# Patient Record
Sex: FEMALE | Race: BLACK OR AFRICAN AMERICAN | NOT HISPANIC OR LATINO | Employment: FULL TIME | URBAN - METROPOLITAN AREA
[De-identification: names, ages, dates, MRNs, and addresses within clinical notes are randomized per-mention and may not be internally consistent; named-entity substitution may affect disease eponyms.]

---

## 2017-08-24 ENCOUNTER — HOSPITAL ENCOUNTER (OUTPATIENT)
Dept: GENERAL RADIOLOGY | Facility: HOSPITAL | Age: 57
Discharge: HOME | End: 2017-08-24
Admitting: UROLOGY

## 2017-08-24 ENCOUNTER — APPOINTMENT (OUTPATIENT)
Dept: PREADMISSION TESTING | Facility: HOSPITAL | Age: 57
End: 2017-08-24

## 2017-08-24 VITALS
WEIGHT: 158.4 LBS | HEIGHT: 60 IN | OXYGEN SATURATION: 99 % | SYSTOLIC BLOOD PRESSURE: 114 MMHG | TEMPERATURE: 98 F | RESPIRATION RATE: 16 BRPM | HEART RATE: 77 BPM | DIASTOLIC BLOOD PRESSURE: 69 MMHG | BODY MASS INDEX: 31.1 KG/M2

## 2017-08-24 LAB
ABO GROUP BLD: NORMAL
ANION GAP SERPL CALCULATED.3IONS-SCNC: 14.7 MMOL/L
BLD GP AB SCN SERPL QL: NEGATIVE
BUN BLD-MCNC: 15 MG/DL (ref 6–20)
BUN/CREAT SERPL: 19.5 (ref 7–25)
CALCIUM SPEC-SCNC: 9.7 MG/DL (ref 8.6–10.5)
CHLORIDE SERPL-SCNC: 104 MMOL/L (ref 98–107)
CO2 SERPL-SCNC: 24.3 MMOL/L (ref 22–29)
CREAT BLD-MCNC: 0.77 MG/DL (ref 0.57–1)
DEPRECATED RDW RBC AUTO: 48.3 FL (ref 37–54)
ERYTHROCYTE [DISTWIDTH] IN BLOOD BY AUTOMATED COUNT: 19.7 % (ref 11.7–13)
GFR SERPL CREATININE-BSD FRML MDRD: 94 ML/MIN/1.73
GLUCOSE BLD-MCNC: 84 MG/DL (ref 65–99)
HCT VFR BLD AUTO: 32.2 % (ref 35.6–45.5)
HGB BLD-MCNC: 11.1 G/DL (ref 11.9–15.5)
MCH RBC QN AUTO: 23.2 PG (ref 26.9–32)
MCHC RBC AUTO-ENTMCNC: 34.5 G/DL (ref 32.4–36.3)
MCV RBC AUTO: 67.4 FL (ref 80.5–98.2)
PLATELET # BLD AUTO: 250 10*3/MM3 (ref 140–500)
PMV BLD AUTO: ABNORMAL FL (ref 6–12)
POTASSIUM BLD-SCNC: 4.2 MMOL/L (ref 3.5–5.2)
RBC # BLD AUTO: 4.78 10*6/MM3 (ref 3.9–5.2)
RH BLD: POSITIVE
SODIUM BLD-SCNC: 143 MMOL/L (ref 136–145)
WBC NRBC COR # BLD: 5.77 10*3/MM3 (ref 4.5–10.7)

## 2017-08-24 PROCEDURE — 86900 BLOOD TYPING SEROLOGIC ABO: CPT | Performed by: UROLOGY

## 2017-08-24 PROCEDURE — 86850 RBC ANTIBODY SCREEN: CPT | Performed by: UROLOGY

## 2017-08-24 PROCEDURE — 93005 ELECTROCARDIOGRAM TRACING: CPT

## 2017-08-24 PROCEDURE — 71020 HC CHEST PA AND LATERAL: CPT

## 2017-08-24 PROCEDURE — 86901 BLOOD TYPING SEROLOGIC RH(D): CPT | Performed by: UROLOGY

## 2017-08-24 PROCEDURE — 36415 COLL VENOUS BLD VENIPUNCTURE: CPT

## 2017-08-24 PROCEDURE — 80048 BASIC METABOLIC PNL TOTAL CA: CPT | Performed by: UROLOGY

## 2017-08-24 PROCEDURE — 85027 COMPLETE CBC AUTOMATED: CPT | Performed by: UROLOGY

## 2017-08-24 PROCEDURE — 93010 ELECTROCARDIOGRAM REPORT: CPT | Performed by: INTERNAL MEDICINE

## 2017-08-24 NOTE — DISCHARGE INSTRUCTIONS
Take the following medications the morning of surgery with a small sip of water:  NONE    ARRIVE AT 1:30PM        General Instructions:  • Do not eat solid food after midnight the night before surgery.  • You may drink clear liquids day of surgery but must stop at least one hour before your hospital arrival time.  • It is beneficial for you to have a clear drink that contains carbohydrates the day of surgery.  We suggest a 20 ounce bottle of Gatorade or Powerade for non-diabetic patients or a 20 ounce bottle of G2 or Powerade Zero for diabetic patients. (Pediatric patients, are not advised to drink a 20 ounce carbohydrate drink)    Clear liquids are liquids you can see through.  Nothing red in color.     Plain water                               Sports drinks  Sodas                                   Gelatin (Jell-O)  Fruit juices without pulp such as white grape juice and apple juice  Popsicles that contain no fruit or yogurt  Tea or coffee (no cream or milk added)  Gatorade / Powerade  G2 / Powerade Zero    • Infants may have breast milk up to four hours before surgery.  • Infants drinking formula may drink formula up to six hours before surgery.   • Patients who avoid smoking, chewing tobacco and alcohol for 4 weeks prior to surgery have a reduced risk of post-operative complications.  Quit smoking as many days before surgery as you can.  • Do not smoke, use chewing tobacco or drink alcohol the day of surgery.   • If applicable bring your C-PAP/ BI-PAP machine.  • Bring any papers given to you in the doctor’s office.  • Wear clean comfortable clothes and socks.  • Do not wear contact lenses or make-up.  Bring a case for your glasses.   • Bring crutches or walker if applicable.  • Leave all other valuables and jewelry at home.  • The Pre-Admission Testing nurse will instruct you to bring medications if unable to obtain an accurate list in Pre-Admission Testing.        If you were given a blood bank ID arm band  remember to bring it with you the day of surgery.    Preventing a Surgical Site Infection:  • For 2 to 3 days before surgery, avoid shaving with a razor because the razor can irritate skin and make it easier to develop an infection.  • The night prior to surgery sleep in a clean bed with clean clothing.  Do not allow pets to sleep with you.  • Shower on the morning of surgery using a fresh bar of anti-bacterial soap (such as Dial) and clean washcloth.  Dry with a clean towel and dress in clean clothing.  • Ask your surgeon if you will be receiving antibiotics prior to surgery.  • Make sure you, your family, and all healthcare providers clean their hands with soap and water or an alcohol based hand  before caring for you or your wound.    Day of surgery:  Upon arrival, a Pre-op nurse and Anesthesiologist will review your health history, obtain vital signs, and answer questions you may have.  The only belongings needed at this time will be your home medications and if applicable your C-PAP/BI-PAP machine.  If you are staying overnight your family can leave the rest of your belongings in the car and bring them to your room later.  A Pre-op nurse will start an IV and you may receive medication in preparation for surgery, including something to help you relax.  Your family will be able to see you in the Pre-op area.  While you are in surgery your family should notify the waiting room  if they leave the waiting room area and provide a contact phone number.    Please be aware that surgery does come with discomfort.  We want to make every effort to control your discomfort so please discuss any uncontrolled symptoms with your nurse.   Your doctor will most likely have prescribed pain medications.      If you are going home after surgery you will receive individualized written care instructions before being discharged.  A responsible adult must drive you to and from the hospital on the day of your surgery  and stay with you for 24 hours.    If you are staying overnight following surgery, you will be transported to your hospital room following the recovery period.  [facility] has all private rooms.    If you have any questions please call Pre-Admission Testing at [phone].  Deductibles and co-payments are collected on the day of service. Please be prepared to pay the required co-pay, deductible or deposit on the day of service as defined by your plan.

## 2017-08-28 ENCOUNTER — ANESTHESIA (OUTPATIENT)
Dept: PERIOP | Facility: HOSPITAL | Age: 57
End: 2017-08-28

## 2017-08-28 ENCOUNTER — HOSPITAL ENCOUNTER (INPATIENT)
Facility: HOSPITAL | Age: 57
LOS: 3 days | Discharge: HOME | End: 2017-08-31
Attending: UROLOGY | Admitting: UROLOGY

## 2017-08-28 ENCOUNTER — ANESTHESIA EVENT (OUTPATIENT)
Dept: PERIOP | Facility: HOSPITAL | Age: 57
End: 2017-08-28

## 2017-08-28 DIAGNOSIS — N28.89 LEFT RENAL MASS: ICD-10-CM

## 2017-08-28 LAB
ANION GAP SERPL CALCULATED.3IONS-SCNC: 11.6 MMOL/L
BUN BLD-MCNC: 14 MG/DL (ref 6–20)
BUN/CREAT SERPL: 16.3 (ref 7–25)
CALCIUM SPEC-SCNC: 9 MG/DL (ref 8.6–10.5)
CHLORIDE SERPL-SCNC: 102 MMOL/L (ref 98–107)
CO2 SERPL-SCNC: 25.4 MMOL/L (ref 22–29)
CREAT BLD-MCNC: 0.86 MG/DL (ref 0.57–1)
DEPRECATED RDW RBC AUTO: 46.7 FL (ref 37–54)
ERYTHROCYTE [DISTWIDTH] IN BLOOD BY AUTOMATED COUNT: 19.5 % (ref 11.7–13)
GFR SERPL CREATININE-BSD FRML MDRD: 83 ML/MIN/1.73
GLUCOSE BLD-MCNC: 136 MG/DL (ref 65–99)
HCT VFR BLD AUTO: 33.1 % (ref 35.6–45.5)
HGB BLD-MCNC: 11.6 G/DL (ref 11.9–15.5)
MCH RBC QN AUTO: 23.2 PG (ref 26.9–32)
MCHC RBC AUTO-ENTMCNC: 35 G/DL (ref 32.4–36.3)
MCV RBC AUTO: 66.1 FL (ref 80.5–98.2)
PLATELET # BLD AUTO: 249 10*3/MM3 (ref 140–500)
POTASSIUM BLD-SCNC: 4.3 MMOL/L (ref 3.5–5.2)
RBC # BLD AUTO: 5.01 10*6/MM3 (ref 3.9–5.2)
SODIUM BLD-SCNC: 139 MMOL/L (ref 136–145)
WBC NRBC COR # BLD: 12.3 10*3/MM3 (ref 4.5–10.7)

## 2017-08-28 PROCEDURE — 88307 TISSUE EXAM BY PATHOLOGIST: CPT | Performed by: UROLOGY

## 2017-08-28 PROCEDURE — 94799 UNLISTED PULMONARY SVC/PX: CPT

## 2017-08-28 PROCEDURE — 25010000002 HYDROMORPHONE PER 4 MG: Performed by: NURSE ANESTHETIST, CERTIFIED REGISTERED

## 2017-08-28 PROCEDURE — 25010000002 FENTANYL CITRATE (PF) 100 MCG/2ML SOLUTION: Performed by: NURSE ANESTHETIST, CERTIFIED REGISTERED

## 2017-08-28 PROCEDURE — 88341 IMHCHEM/IMCYTCHM EA ADD ANTB: CPT | Performed by: UROLOGY

## 2017-08-28 PROCEDURE — 88344 IMHCHEM/IMCYTCHM EA MLT ANTB: CPT | Performed by: UROLOGY

## 2017-08-28 PROCEDURE — 88342 IMHCHEM/IMCYTCHM 1ST ANTB: CPT | Performed by: UROLOGY

## 2017-08-28 PROCEDURE — 25010000002 ONDANSETRON PER 1 MG: Performed by: UROLOGY

## 2017-08-28 PROCEDURE — 85027 COMPLETE CBC AUTOMATED: CPT | Performed by: UROLOGY

## 2017-08-28 PROCEDURE — 80048 BASIC METABOLIC PNL TOTAL CA: CPT | Performed by: UROLOGY

## 2017-08-28 PROCEDURE — 25010000002 ONDANSETRON PER 1 MG: Performed by: NURSE ANESTHETIST, CERTIFIED REGISTERED

## 2017-08-28 PROCEDURE — 25010000002 MIDAZOLAM PER 1 MG: Performed by: ANESTHESIOLOGY

## 2017-08-28 PROCEDURE — 25010000003 CEFAZOLIN IN DEXTROSE 2-4 GM/100ML-% SOLUTION: Performed by: UROLOGY

## 2017-08-28 PROCEDURE — 25010000002 PROPOFOL 10 MG/ML EMULSION: Performed by: NURSE ANESTHETIST, CERTIFIED REGISTERED

## 2017-08-28 PROCEDURE — 88331 PATH CONSLTJ SURG 1 BLK 1SPC: CPT | Performed by: UROLOGY

## 2017-08-28 PROCEDURE — 25010000002 NEOSTIGMINE PER 0.5 MG: Performed by: NURSE ANESTHETIST, CERTIFIED REGISTERED

## 2017-08-28 PROCEDURE — 0TT14ZZ RESECTION OF LEFT KIDNEY, PERCUTANEOUS ENDOSCOPIC APPROACH: ICD-10-PCS | Performed by: UROLOGY

## 2017-08-28 PROCEDURE — 25010000002 HYDROMORPHONE PER 4 MG: Performed by: UROLOGY

## 2017-08-28 RX ORDER — NALOXONE HCL 0.4 MG/ML
0.1 VIAL (ML) INJECTION
Status: DISCONTINUED | OUTPATIENT
Start: 2017-08-28 | End: 2017-08-31 | Stop reason: HOSPADM

## 2017-08-28 RX ORDER — HYDROCODONE BITARTRATE AND ACETAMINOPHEN 7.5; 325 MG/1; MG/1
1 TABLET ORAL EVERY 4 HOURS PRN
Status: DISCONTINUED | OUTPATIENT
Start: 2017-08-28 | End: 2017-08-31 | Stop reason: HOSPADM

## 2017-08-28 RX ORDER — ONDANSETRON 4 MG/1
4 TABLET, ORALLY DISINTEGRATING ORAL EVERY 6 HOURS PRN
Status: DISCONTINUED | OUTPATIENT
Start: 2017-08-28 | End: 2017-08-31 | Stop reason: HOSPADM

## 2017-08-28 RX ORDER — HYDRALAZINE HYDROCHLORIDE 20 MG/ML
5 INJECTION INTRAMUSCULAR; INTRAVENOUS
Status: DISCONTINUED | OUTPATIENT
Start: 2017-08-28 | End: 2017-08-28 | Stop reason: HOSPADM

## 2017-08-28 RX ORDER — ONDANSETRON 2 MG/ML
4 INJECTION INTRAMUSCULAR; INTRAVENOUS EVERY 6 HOURS PRN
Status: DISCONTINUED | OUTPATIENT
Start: 2017-08-28 | End: 2017-08-31 | Stop reason: HOSPADM

## 2017-08-28 RX ORDER — PROMETHAZINE HYDROCHLORIDE 25 MG/1
25 SUPPOSITORY RECTAL ONCE AS NEEDED
Status: DISCONTINUED | OUTPATIENT
Start: 2017-08-28 | End: 2017-08-28 | Stop reason: HOSPADM

## 2017-08-28 RX ORDER — PROMETHAZINE HYDROCHLORIDE 25 MG/ML
12.5 INJECTION, SOLUTION INTRAMUSCULAR; INTRAVENOUS ONCE AS NEEDED
Status: DISCONTINUED | OUTPATIENT
Start: 2017-08-28 | End: 2017-08-28 | Stop reason: HOSPADM

## 2017-08-28 RX ORDER — BISACODYL 10 MG
10 SUPPOSITORY, RECTAL RECTAL DAILY
Status: DISCONTINUED | OUTPATIENT
Start: 2017-08-29 | End: 2017-08-31

## 2017-08-28 RX ORDER — ONDANSETRON 2 MG/ML
INJECTION INTRAMUSCULAR; INTRAVENOUS AS NEEDED
Status: DISCONTINUED | OUTPATIENT
Start: 2017-08-28 | End: 2017-08-28 | Stop reason: SURG

## 2017-08-28 RX ORDER — FENTANYL CITRATE 50 UG/ML
50 INJECTION, SOLUTION INTRAMUSCULAR; INTRAVENOUS
Status: DISCONTINUED | OUTPATIENT
Start: 2017-08-28 | End: 2017-08-28 | Stop reason: HOSPADM

## 2017-08-28 RX ORDER — LABETALOL HYDROCHLORIDE 5 MG/ML
5 INJECTION, SOLUTION INTRAVENOUS
Status: DISCONTINUED | OUTPATIENT
Start: 2017-08-28 | End: 2017-08-28 | Stop reason: HOSPADM

## 2017-08-28 RX ORDER — HYDROMORPHONE HYDROCHLORIDE 1 MG/ML
0.5 INJECTION, SOLUTION INTRAMUSCULAR; INTRAVENOUS; SUBCUTANEOUS
Status: DISCONTINUED | OUTPATIENT
Start: 2017-08-28 | End: 2017-08-28 | Stop reason: HOSPADM

## 2017-08-28 RX ORDER — FAMOTIDINE 10 MG/ML
20 INJECTION, SOLUTION INTRAVENOUS ONCE
Status: COMPLETED | OUTPATIENT
Start: 2017-08-28 | End: 2017-08-28

## 2017-08-28 RX ORDER — MIDAZOLAM HYDROCHLORIDE 1 MG/ML
1 INJECTION INTRAMUSCULAR; INTRAVENOUS
Status: DISCONTINUED | OUTPATIENT
Start: 2017-08-28 | End: 2017-08-28 | Stop reason: HOSPADM

## 2017-08-28 RX ORDER — SENNA AND DOCUSATE SODIUM 50; 8.6 MG/1; MG/1
2 TABLET, FILM COATED ORAL 2 TIMES DAILY
Status: DISCONTINUED | OUTPATIENT
Start: 2017-08-28 | End: 2017-08-31 | Stop reason: HOSPADM

## 2017-08-28 RX ORDER — HYDROMORPHONE HYDROCHLORIDE 1 MG/ML
0.5 INJECTION, SOLUTION INTRAMUSCULAR; INTRAVENOUS; SUBCUTANEOUS
Status: DISCONTINUED | OUTPATIENT
Start: 2017-08-28 | End: 2017-08-31 | Stop reason: HOSPADM

## 2017-08-28 RX ORDER — GLYCOPYRROLATE 0.2 MG/ML
INJECTION INTRAMUSCULAR; INTRAVENOUS AS NEEDED
Status: DISCONTINUED | OUTPATIENT
Start: 2017-08-28 | End: 2017-08-28 | Stop reason: SURG

## 2017-08-28 RX ORDER — SODIUM CHLORIDE 9 MG/ML
50 INJECTION, SOLUTION INTRAVENOUS CONTINUOUS
Status: DISCONTINUED | OUTPATIENT
Start: 2017-08-28 | End: 2017-08-31 | Stop reason: HOSPADM

## 2017-08-28 RX ORDER — PROPOFOL 10 MG/ML
VIAL (ML) INTRAVENOUS AS NEEDED
Status: DISCONTINUED | OUTPATIENT
Start: 2017-08-28 | End: 2017-08-28 | Stop reason: SURG

## 2017-08-28 RX ORDER — MIDAZOLAM HYDROCHLORIDE 1 MG/ML
2 INJECTION INTRAMUSCULAR; INTRAVENOUS
Status: DISCONTINUED | OUTPATIENT
Start: 2017-08-28 | End: 2017-08-28 | Stop reason: HOSPADM

## 2017-08-28 RX ORDER — EPHEDRINE SULFATE 50 MG/ML
5 INJECTION, SOLUTION INTRAVENOUS ONCE AS NEEDED
Status: DISCONTINUED | OUTPATIENT
Start: 2017-08-28 | End: 2017-08-28 | Stop reason: HOSPADM

## 2017-08-28 RX ORDER — CEFAZOLIN SODIUM 2 G/100ML
2 INJECTION, SOLUTION INTRAVENOUS EVERY 8 HOURS
Status: DISCONTINUED | OUTPATIENT
Start: 2017-08-28 | End: 2017-08-28 | Stop reason: HOSPADM

## 2017-08-28 RX ORDER — NALOXONE HCL 0.4 MG/ML
0.2 VIAL (ML) INJECTION AS NEEDED
Status: DISCONTINUED | OUTPATIENT
Start: 2017-08-28 | End: 2017-08-28 | Stop reason: HOSPADM

## 2017-08-28 RX ORDER — CEFAZOLIN SODIUM 2 G/100ML
INJECTION, SOLUTION INTRAVENOUS
Status: DISPENSED
Start: 2017-08-28 | End: 2017-08-29

## 2017-08-28 RX ORDER — FLUMAZENIL 0.1 MG/ML
0.2 INJECTION INTRAVENOUS AS NEEDED
Status: DISCONTINUED | OUTPATIENT
Start: 2017-08-28 | End: 2017-08-28 | Stop reason: HOSPADM

## 2017-08-28 RX ORDER — DIPHENHYDRAMINE HYDROCHLORIDE 50 MG/ML
12.5 INJECTION INTRAMUSCULAR; INTRAVENOUS
Status: DISCONTINUED | OUTPATIENT
Start: 2017-08-28 | End: 2017-08-28 | Stop reason: HOSPADM

## 2017-08-28 RX ORDER — ROCURONIUM BROMIDE 10 MG/ML
INJECTION, SOLUTION INTRAVENOUS AS NEEDED
Status: DISCONTINUED | OUTPATIENT
Start: 2017-08-28 | End: 2017-08-28 | Stop reason: SURG

## 2017-08-28 RX ORDER — ONDANSETRON 4 MG/1
4 TABLET, FILM COATED ORAL EVERY 6 HOURS PRN
Status: DISCONTINUED | OUTPATIENT
Start: 2017-08-28 | End: 2017-08-31 | Stop reason: HOSPADM

## 2017-08-28 RX ORDER — FENTANYL CITRATE 50 UG/ML
INJECTION, SOLUTION INTRAMUSCULAR; INTRAVENOUS AS NEEDED
Status: DISCONTINUED | OUTPATIENT
Start: 2017-08-28 | End: 2017-08-28 | Stop reason: SURG

## 2017-08-28 RX ORDER — LIDOCAINE HYDROCHLORIDE 20 MG/ML
INJECTION, SOLUTION INFILTRATION; PERINEURAL AS NEEDED
Status: DISCONTINUED | OUTPATIENT
Start: 2017-08-28 | End: 2017-08-28 | Stop reason: SURG

## 2017-08-28 RX ORDER — SODIUM CHLORIDE 0.9 % (FLUSH) 0.9 %
1-10 SYRINGE (ML) INJECTION AS NEEDED
Status: DISCONTINUED | OUTPATIENT
Start: 2017-08-28 | End: 2017-08-28 | Stop reason: HOSPADM

## 2017-08-28 RX ORDER — SODIUM CHLORIDE, SODIUM LACTATE, POTASSIUM CHLORIDE, CALCIUM CHLORIDE 600; 310; 30; 20 MG/100ML; MG/100ML; MG/100ML; MG/100ML
100 INJECTION, SOLUTION INTRAVENOUS CONTINUOUS
Status: DISCONTINUED | OUTPATIENT
Start: 2017-08-28 | End: 2017-08-29

## 2017-08-28 RX ORDER — ALBUTEROL SULFATE 2.5 MG/3ML
2.5 SOLUTION RESPIRATORY (INHALATION) ONCE AS NEEDED
Status: DISCONTINUED | OUTPATIENT
Start: 2017-08-28 | End: 2017-08-28 | Stop reason: HOSPADM

## 2017-08-28 RX ORDER — PROMETHAZINE HYDROCHLORIDE 25 MG/1
12.5 TABLET ORAL ONCE AS NEEDED
Status: DISCONTINUED | OUTPATIENT
Start: 2017-08-28 | End: 2017-08-28 | Stop reason: HOSPADM

## 2017-08-28 RX ORDER — LIDOCAINE HYDROCHLORIDE 10 MG/ML
INJECTION, SOLUTION EPIDURAL; INFILTRATION; INTRACAUDAL; PERINEURAL AS NEEDED
Status: DISCONTINUED | OUTPATIENT
Start: 2017-08-28 | End: 2017-08-28 | Stop reason: HOSPADM

## 2017-08-28 RX ORDER — HYDROMORPHONE HCL 110MG/55ML
PATIENT CONTROLLED ANALGESIA SYRINGE INTRAVENOUS AS NEEDED
Status: DISCONTINUED | OUTPATIENT
Start: 2017-08-28 | End: 2017-08-28 | Stop reason: SURG

## 2017-08-28 RX ORDER — SODIUM CHLORIDE 9 MG/ML
INJECTION, SOLUTION INTRAVENOUS AS NEEDED
Status: DISCONTINUED | OUTPATIENT
Start: 2017-08-28 | End: 2017-08-28 | Stop reason: HOSPADM

## 2017-08-28 RX ORDER — ONDANSETRON 2 MG/ML
4 INJECTION INTRAMUSCULAR; INTRAVENOUS ONCE AS NEEDED
Status: DISCONTINUED | OUTPATIENT
Start: 2017-08-28 | End: 2017-08-28 | Stop reason: HOSPADM

## 2017-08-28 RX ORDER — SODIUM CHLORIDE, SODIUM LACTATE, POTASSIUM CHLORIDE, CALCIUM CHLORIDE 600; 310; 30; 20 MG/100ML; MG/100ML; MG/100ML; MG/100ML
9 INJECTION, SOLUTION INTRAVENOUS CONTINUOUS
Status: DISCONTINUED | OUTPATIENT
Start: 2017-08-28 | End: 2017-08-29 | Stop reason: HOSPADM

## 2017-08-28 RX ORDER — PROMETHAZINE HYDROCHLORIDE 25 MG/1
25 TABLET ORAL ONCE AS NEEDED
Status: DISCONTINUED | OUTPATIENT
Start: 2017-08-28 | End: 2017-08-28 | Stop reason: HOSPADM

## 2017-08-28 RX ADMIN — LIDOCAINE HYDROCHLORIDE 40 MG: 20 INJECTION, SOLUTION INFILTRATION; PERINEURAL at 16:58

## 2017-08-28 RX ADMIN — SODIUM CHLORIDE, POTASSIUM CHLORIDE, SODIUM LACTATE AND CALCIUM CHLORIDE 9 ML/HR: 600; 310; 30; 20 INJECTION, SOLUTION INTRAVENOUS at 14:20

## 2017-08-28 RX ADMIN — HYDROMORPHONE HYDROCHLORIDE 0.5 MG: 1 INJECTION, SOLUTION INTRAMUSCULAR; INTRAVENOUS; SUBCUTANEOUS at 19:34

## 2017-08-28 RX ADMIN — HYDROMORPHONE HYDROCHLORIDE 0.5 MG: 1 INJECTION, SOLUTION INTRAMUSCULAR; INTRAVENOUS; SUBCUTANEOUS at 21:52

## 2017-08-28 RX ADMIN — CEFAZOLIN SODIUM 2 G: 2 INJECTION, SOLUTION INTRAVENOUS at 16:52

## 2017-08-28 RX ADMIN — SODIUM CHLORIDE, POTASSIUM CHLORIDE, SODIUM LACTATE AND CALCIUM CHLORIDE: 600; 310; 30; 20 INJECTION, SOLUTION INTRAVENOUS at 17:54

## 2017-08-28 RX ADMIN — FENTANYL CITRATE 50 MCG: 50 INJECTION INTRAMUSCULAR; INTRAVENOUS at 17:25

## 2017-08-28 RX ADMIN — GLYCOPYRROLATE 0.4 MG: 0.2 INJECTION INTRAMUSCULAR; INTRAVENOUS at 18:48

## 2017-08-28 RX ADMIN — FENTANYL CITRATE 50 MCG: 50 INJECTION INTRAMUSCULAR; INTRAVENOUS at 20:12

## 2017-08-28 RX ADMIN — NEOSTIGMINE METHYLSULFATE 3 MG: 1 INJECTION INTRAMUSCULAR; INTRAVENOUS; SUBCUTANEOUS at 18:48

## 2017-08-28 RX ADMIN — SODIUM CHLORIDE 100 ML/HR: 9 INJECTION, SOLUTION INTRAVENOUS at 21:52

## 2017-08-28 RX ADMIN — ONDANSETRON 4 MG: 2 INJECTION INTRAMUSCULAR; INTRAVENOUS at 18:49

## 2017-08-28 RX ADMIN — HYDROCODONE BITARTRATE AND ACETAMINOPHEN 1 TABLET: 7.5; 325 TABLET ORAL at 23:27

## 2017-08-28 RX ADMIN — FENTANYL CITRATE 50 MCG: 50 INJECTION INTRAMUSCULAR; INTRAVENOUS at 18:09

## 2017-08-28 RX ADMIN — FENTANYL CITRATE 50 MCG: 50 INJECTION INTRAMUSCULAR; INTRAVENOUS at 19:32

## 2017-08-28 RX ADMIN — FENTANYL CITRATE 100 MCG: 50 INJECTION INTRAMUSCULAR; INTRAVENOUS at 16:58

## 2017-08-28 RX ADMIN — HYDROMORPHONE HYDROCHLORIDE 0.5 MG: 2 INJECTION, SOLUTION INTRAMUSCULAR; INTRAVENOUS; SUBCUTANEOUS at 18:20

## 2017-08-28 RX ADMIN — FAMOTIDINE 20 MG: 10 INJECTION INTRAVENOUS at 14:20

## 2017-08-28 RX ADMIN — PROPOFOL 200 MG: 10 INJECTION, EMULSION INTRAVENOUS at 16:58

## 2017-08-28 RX ADMIN — MIDAZOLAM 2 MG: 1 INJECTION INTRAMUSCULAR; INTRAVENOUS at 15:13

## 2017-08-28 RX ADMIN — ROCURONIUM BROMIDE 50 MG: 10 INJECTION INTRAVENOUS at 16:58

## 2017-08-28 RX ADMIN — MIDAZOLAM 2 MG: 1 INJECTION INTRAMUSCULAR; INTRAVENOUS at 14:45

## 2017-08-28 RX ADMIN — ONDANSETRON 4 MG: 2 INJECTION INTRAMUSCULAR; INTRAVENOUS at 21:55

## 2017-08-28 NOTE — ANESTHESIA PREPROCEDURE EVALUATION
Anesthesia Evaluation     Patient summary reviewed   NPO Solid Status: > 8 hours  NPO Liquid Status: > 8 hours     Airway   Mallampati: II  TM distance: >3 FB  Dental      Pulmonary    (+) a smoker Current,     ROS comment: Did not smoke today.  Cardiovascular     ECG reviewed  Rhythm: regular  Rate: normal        Neuro/Psych  GI/Hepatic/Renal/Endo    (+) obesity,      Musculoskeletal     Abdominal    Substance History      OB/GYN          Other      history of cancer active                                    Anesthesia Plan    ASA 2     general     intravenous induction   Anesthetic plan and risks discussed with patient.

## 2017-08-28 NOTE — ANESTHESIA PROCEDURE NOTES
Airway  Urgency: elective    Airway not difficult    General Information and Staff    Patient location during procedure: OR  Anesthesiologist: YUAN AQUINO  CRNA: MARYBETH PRO    Indications and Patient Condition  Indications for airway management: airway protection    Preoxygenated: yes  MILS not maintained throughout  Mask difficulty assessment: 1 - vent by mask    Final Airway Details  Final airway type: endotracheal airway      Successful airway: ETT  Cuffed: yes   Successful intubation technique: direct laryngoscopy  Facilitating devices/methods: intubating stylet  Endotracheal tube insertion site: oral  Blade: Rafael  Blade size: #3  ETT size: 7.0 mm  Cormack-Lehane Classification: grade I - full view of glottis  Placement verified by: chest auscultation   Cuff volume (mL): 8  Measured from: lips  ETT to lips (cm): 21  Number of attempts at approach: 1    Additional Comments  PreO2 100% face mask, IV induction, easy mask, DVL x1, cords noted, tube through, cuff up, EBBSH, +etCO2, = chest movement, tube secured in place, atraumatic, teeth right front upper tooth with small chip noted next to  Gold crown, teeth, caps and lips intact as preop.

## 2017-08-28 NOTE — ANESTHESIA POSTPROCEDURE EVALUATION
"Patient: Kimi Thomas    Procedure Summary     Date Anesthesia Start Anesthesia Stop Room / Location    08/28/17 1652 1906  [facility] / [facility]       Procedure Diagnosis Surgeon Provider    HAND ASSISTED LEFT LAPAROSCOPIC NEPHRECTOMY  (Left Abdomen) No diagnosis on file. MD Oswaldo Murphy Jr., MD          Anesthesia Type: general  Last vitals  BP   143/73 (08/28/17 1920)    Temp   36.6 °C (97.8 °F) (08/28/17 1904)    Pulse   74 (08/28/17 1920)   Resp   16 (08/28/17 1920)    SpO2   100 % (08/28/17 1920)      Post Anesthesia Care and Evaluation    Patient location during evaluation: PACU  Patient participation: complete - patient participated  Level of consciousness: awake and alert  Pain management: adequate  Airway patency: patent  Anesthetic complications: No anesthetic complications    Cardiovascular status: acceptable  Respiratory status: acceptable  Hydration status: acceptable    Comments: /73 (BP Location: Left arm, Patient Position: Lying)  Pulse 74  Temp 36.6 °C (97.8 °F) (Oral)   Resp 16  Ht 60\" (152.4 cm)  Wt 155 lb 6.4 oz (70.5 kg)  SpO2 100%  BMI 30.35 kg/m2          "

## 2017-08-29 LAB
ANION GAP SERPL CALCULATED.3IONS-SCNC: 12.5 MMOL/L
BUN BLD-MCNC: 16 MG/DL (ref 6–20)
BUN/CREAT SERPL: 12.7 (ref 7–25)
CALCIUM SPEC-SCNC: 9.5 MG/DL (ref 8.6–10.5)
CHLORIDE SERPL-SCNC: 102 MMOL/L (ref 98–107)
CO2 SERPL-SCNC: 23.5 MMOL/L (ref 22–29)
CREAT BLD-MCNC: 1.26 MG/DL (ref 0.57–1)
DEPRECATED RDW RBC AUTO: 46.3 FL (ref 37–54)
ERYTHROCYTE [DISTWIDTH] IN BLOOD BY AUTOMATED COUNT: 19.7 % (ref 11.7–13)
GFR SERPL CREATININE-BSD FRML MDRD: 53 ML/MIN/1.73
GLUCOSE BLD-MCNC: 104 MG/DL (ref 65–99)
HCT VFR BLD AUTO: 30.4 % (ref 35.6–45.5)
HGB BLD-MCNC: 10.7 G/DL (ref 11.9–15.5)
MCH RBC QN AUTO: 23.1 PG (ref 26.9–32)
MCHC RBC AUTO-ENTMCNC: 35.2 G/DL (ref 32.4–36.3)
MCV RBC AUTO: 65.7 FL (ref 80.5–98.2)
PLATELET # BLD AUTO: 221 10*3/MM3 (ref 140–500)
POTASSIUM BLD-SCNC: 4.6 MMOL/L (ref 3.5–5.2)
RBC # BLD AUTO: 4.63 10*6/MM3 (ref 3.9–5.2)
SODIUM BLD-SCNC: 138 MMOL/L (ref 136–145)
WBC NRBC COR # BLD: 12.3 10*3/MM3 (ref 4.5–10.7)

## 2017-08-29 PROCEDURE — 25010000002 HYDROMORPHONE PER 4 MG: Performed by: UROLOGY

## 2017-08-29 PROCEDURE — 85027 COMPLETE CBC AUTOMATED: CPT | Performed by: UROLOGY

## 2017-08-29 PROCEDURE — 25010000002 ONDANSETRON PER 1 MG: Performed by: UROLOGY

## 2017-08-29 PROCEDURE — 94799 UNLISTED PULMONARY SVC/PX: CPT

## 2017-08-29 PROCEDURE — 80048 BASIC METABOLIC PNL TOTAL CA: CPT | Performed by: UROLOGY

## 2017-08-29 PROCEDURE — 25010000002 PROMETHAZINE PER 50 MG: Performed by: UROLOGY

## 2017-08-29 RX ORDER — PROMETHAZINE HYDROCHLORIDE 25 MG/ML
12.5 INJECTION, SOLUTION INTRAMUSCULAR; INTRAVENOUS EVERY 6 HOURS PRN
Status: DISCONTINUED | OUTPATIENT
Start: 2017-08-29 | End: 2017-08-31 | Stop reason: HOSPADM

## 2017-08-29 RX ADMIN — SODIUM CHLORIDE 100 ML/HR: 9 INJECTION, SOLUTION INTRAVENOUS at 07:48

## 2017-08-29 RX ADMIN — DOCUSATE SODIUM -SENNOSIDES 2 TABLET: 50; 8.6 TABLET, COATED ORAL at 08:58

## 2017-08-29 RX ADMIN — HYDROCODONE BITARTRATE AND ACETAMINOPHEN 1 TABLET: 7.5; 325 TABLET ORAL at 05:48

## 2017-08-29 RX ADMIN — SODIUM CHLORIDE 75 ML/HR: 9 INJECTION, SOLUTION INTRAVENOUS at 17:37

## 2017-08-29 RX ADMIN — HYDROCODONE BITARTRATE AND ACETAMINOPHEN 1 TABLET: 7.5; 325 TABLET ORAL at 18:42

## 2017-08-29 RX ADMIN — HYDROMORPHONE HYDROCHLORIDE 0.5 MG: 1 INJECTION, SOLUTION INTRAMUSCULAR; INTRAVENOUS; SUBCUTANEOUS at 08:57

## 2017-08-29 RX ADMIN — DOCUSATE SODIUM -SENNOSIDES 2 TABLET: 50; 8.6 TABLET, COATED ORAL at 17:37

## 2017-08-29 RX ADMIN — HYDROCODONE BITARTRATE AND ACETAMINOPHEN 1 TABLET: 7.5; 325 TABLET ORAL at 13:53

## 2017-08-29 RX ADMIN — ONDANSETRON 4 MG: 2 INJECTION INTRAMUSCULAR; INTRAVENOUS at 04:28

## 2017-08-29 RX ADMIN — HYDROMORPHONE HYDROCHLORIDE 0.5 MG: 1 INJECTION, SOLUTION INTRAMUSCULAR; INTRAVENOUS; SUBCUTANEOUS at 00:36

## 2017-08-29 RX ADMIN — PROMETHAZINE HYDROCHLORIDE 12.5 MG: 25 INJECTION INTRAMUSCULAR; INTRAVENOUS at 08:57

## 2017-08-29 RX ADMIN — HYDROMORPHONE HYDROCHLORIDE 0.5 MG: 1 INJECTION, SOLUTION INTRAMUSCULAR; INTRAVENOUS; SUBCUTANEOUS at 15:41

## 2017-08-30 LAB
ANION GAP SERPL CALCULATED.3IONS-SCNC: 14.5 MMOL/L
BUN BLD-MCNC: 14 MG/DL (ref 6–20)
BUN/CREAT SERPL: 11.4 (ref 7–25)
CALCIUM SPEC-SCNC: 8.8 MG/DL (ref 8.6–10.5)
CHLORIDE SERPL-SCNC: 102 MMOL/L (ref 98–107)
CO2 SERPL-SCNC: 20.5 MMOL/L (ref 22–29)
CREAT BLD-MCNC: 1.23 MG/DL (ref 0.57–1)
DEPRECATED RDW RBC AUTO: 50 FL (ref 37–54)
ERYTHROCYTE [DISTWIDTH] IN BLOOD BY AUTOMATED COUNT: 20.1 % (ref 11.7–13)
GFR SERPL CREATININE-BSD FRML MDRD: 55 ML/MIN/1.73
GLUCOSE BLD-MCNC: 106 MG/DL (ref 65–99)
HCT VFR BLD AUTO: 29.1 % (ref 35.6–45.5)
HGB BLD-MCNC: 10 G/DL (ref 11.9–15.5)
MCH RBC QN AUTO: 23.3 PG (ref 26.9–32)
MCHC RBC AUTO-ENTMCNC: 34.4 G/DL (ref 32.4–36.3)
MCV RBC AUTO: 67.8 FL (ref 80.5–98.2)
PLATELET # BLD AUTO: 182 10*3/MM3 (ref 140–500)
POTASSIUM BLD-SCNC: 4.2 MMOL/L (ref 3.5–5.2)
RBC # BLD AUTO: 4.29 10*6/MM3 (ref 3.9–5.2)
SODIUM BLD-SCNC: 137 MMOL/L (ref 136–145)
WBC NRBC COR # BLD: 9.51 10*3/MM3 (ref 4.5–10.7)

## 2017-08-30 PROCEDURE — 85027 COMPLETE CBC AUTOMATED: CPT | Performed by: UROLOGY

## 2017-08-30 PROCEDURE — 80048 BASIC METABOLIC PNL TOTAL CA: CPT | Performed by: UROLOGY

## 2017-08-30 PROCEDURE — 94799 UNLISTED PULMONARY SVC/PX: CPT

## 2017-08-30 RX ADMIN — HYDROCODONE BITARTRATE AND ACETAMINOPHEN 1 TABLET: 7.5; 325 TABLET ORAL at 06:50

## 2017-08-30 RX ADMIN — HYDROCODONE BITARTRATE AND ACETAMINOPHEN 1 TABLET: 7.5; 325 TABLET ORAL at 02:10

## 2017-08-30 RX ADMIN — BISACODYL 10 MG: 10 SUPPOSITORY RECTAL at 09:46

## 2017-08-30 RX ADMIN — HYDROCODONE BITARTRATE AND ACETAMINOPHEN 1 TABLET: 7.5; 325 TABLET ORAL at 15:25

## 2017-08-30 RX ADMIN — HYDROCODONE BITARTRATE AND ACETAMINOPHEN 1 TABLET: 7.5; 325 TABLET ORAL at 19:38

## 2017-08-30 RX ADMIN — HYDROCODONE BITARTRATE AND ACETAMINOPHEN 1 TABLET: 7.5; 325 TABLET ORAL at 11:39

## 2017-08-30 RX ADMIN — DOCUSATE SODIUM -SENNOSIDES 2 TABLET: 50; 8.6 TABLET, COATED ORAL at 09:46

## 2017-08-30 RX ADMIN — DOCUSATE SODIUM -SENNOSIDES 2 TABLET: 50; 8.6 TABLET, COATED ORAL at 17:45

## 2017-08-30 RX ADMIN — SODIUM CHLORIDE 75 ML/HR: 9 INJECTION, SOLUTION INTRAVENOUS at 11:39

## 2017-08-31 VITALS
BODY MASS INDEX: 30.51 KG/M2 | DIASTOLIC BLOOD PRESSURE: 63 MMHG | SYSTOLIC BLOOD PRESSURE: 96 MMHG | WEIGHT: 155.4 LBS | RESPIRATION RATE: 18 BRPM | HEIGHT: 60 IN | TEMPERATURE: 99.5 F | OXYGEN SATURATION: 92 % | HEART RATE: 103 BPM

## 2017-08-31 LAB
CYTO UR: NORMAL
LAB AP CASE REPORT: NORMAL
LAB AP CLINICAL INFORMATION: NORMAL
LAB AP INTRADEPARTMENTAL CONSULT: NORMAL
Lab: NORMAL
Lab: NORMAL
PATH REPORT.FINAL DX SPEC: NORMAL
PATH REPORT.GROSS SPEC: NORMAL

## 2017-08-31 RX ORDER — BISACODYL 10 MG
10 SUPPOSITORY, RECTAL RECTAL 2 TIMES DAILY
Status: DISCONTINUED | OUTPATIENT
Start: 2017-08-31 | End: 2017-08-31 | Stop reason: HOSPADM

## 2017-08-31 RX ADMIN — SODIUM CHLORIDE 75 ML/HR: 9 INJECTION, SOLUTION INTRAVENOUS at 00:17

## 2017-08-31 RX ADMIN — HYDROCODONE BITARTRATE AND ACETAMINOPHEN 1 TABLET: 7.5; 325 TABLET ORAL at 06:10

## 2017-08-31 RX ADMIN — HYDROCODONE BITARTRATE AND ACETAMINOPHEN 1 TABLET: 7.5; 325 TABLET ORAL at 00:17

## 2017-10-09 ENCOUNTER — HOSPITAL ENCOUNTER (INPATIENT)
Facility: HOSPITAL | Age: 57
LOS: 10 days | Discharge: HOME | End: 2017-10-19
Attending: EMERGENCY MEDICINE | Admitting: INTERNAL MEDICINE

## 2017-10-09 ENCOUNTER — APPOINTMENT (OUTPATIENT)
Dept: CT IMAGING | Facility: HOSPITAL | Age: 57
End: 2017-10-09

## 2017-10-09 DIAGNOSIS — R93.5 ABNORMAL CT OF THE ABDOMEN: ICD-10-CM

## 2017-10-09 DIAGNOSIS — R10.84 GENERALIZED ABDOMINAL PAIN: Primary | ICD-10-CM

## 2017-10-09 DIAGNOSIS — R18.8 FREE FLUID IN PELVIS: ICD-10-CM

## 2017-10-09 PROBLEM — Z90.5 STATUS POST NEPHRECTOMY: Status: ACTIVE | Noted: 2017-10-09

## 2017-10-09 PROBLEM — Z85.528 HISTORY OF RENAL CELL CARCINOMA: Status: ACTIVE | Noted: 2017-10-09

## 2017-10-09 PROBLEM — N17.9 AKI (ACUTE KIDNEY INJURY) (HCC): Status: ACTIVE | Noted: 2017-10-09

## 2017-10-09 LAB
ALBUMIN SERPL-MCNC: 4.2 G/DL (ref 3.5–5.2)
ALBUMIN/GLOB SERPL: 1.2 G/DL
ALP SERPL-CCNC: 96 U/L (ref 39–117)
ALT SERPL W P-5'-P-CCNC: 7 U/L (ref 1–33)
ANION GAP SERPL CALCULATED.3IONS-SCNC: 13.4 MMOL/L
ANISOCYTOSIS BLD QL: NORMAL
AST SERPL-CCNC: 9 U/L (ref 1–32)
BACTERIA UR QL AUTO: ABNORMAL /HPF
BASOPHILS # BLD AUTO: 0.05 10*3/MM3 (ref 0–0.2)
BASOPHILS NFR BLD AUTO: 0.6 % (ref 0–1.5)
BILIRUB SERPL-MCNC: 0.5 MG/DL (ref 0.1–1.2)
BILIRUB UR QL STRIP: NEGATIVE
BUN BLD-MCNC: 16 MG/DL (ref 6–20)
BUN/CREAT SERPL: 11.5 (ref 7–25)
CALCIUM SPEC-SCNC: 10.5 MG/DL (ref 8.6–10.5)
CHLORIDE SERPL-SCNC: 100 MMOL/L (ref 98–107)
CLARITY UR: ABNORMAL
CO2 SERPL-SCNC: 26.6 MMOL/L (ref 22–29)
COD CRY URNS QL: ABNORMAL /HPF
COLOR UR: ABNORMAL
CREAT BLD-MCNC: 1.39 MG/DL (ref 0.57–1)
DEPRECATED RDW RBC AUTO: 47.2 FL (ref 37–54)
EOSINOPHIL # BLD AUTO: 0.55 10*3/MM3 (ref 0–0.7)
EOSINOPHIL NFR BLD AUTO: 6.3 % (ref 0.3–6.2)
ERYTHROCYTE [DISTWIDTH] IN BLOOD BY AUTOMATED COUNT: 20.3 % (ref 11.7–13)
GFR SERPL CREATININE-BSD FRML MDRD: 48 ML/MIN/1.73
GLOBULIN UR ELPH-MCNC: 3.6 GM/DL
GLUCOSE BLD-MCNC: 105 MG/DL (ref 65–99)
GLUCOSE UR STRIP-MCNC: NEGATIVE MG/DL
HCT VFR BLD AUTO: 33.1 % (ref 35.6–45.5)
HGB BLD-MCNC: 11.5 G/DL (ref 11.9–15.5)
HGB UR QL STRIP.AUTO: ABNORMAL
HYALINE CASTS UR QL AUTO: ABNORMAL /LPF
HYPOCHROMIA BLD QL: NORMAL
IMM GRANULOCYTES # BLD: 0.03 10*3/MM3 (ref 0–0.03)
IMM GRANULOCYTES NFR BLD: 0.3 % (ref 0–0.5)
INR PPP: 1.12 (ref 0.9–1.1)
KETONES UR QL STRIP: ABNORMAL
LEUKOCYTE ESTERASE UR QL STRIP.AUTO: NEGATIVE
LIPASE SERPL-CCNC: 12 U/L (ref 13–60)
LYMPHOCYTES # BLD AUTO: 1.11 10*3/MM3 (ref 0.9–4.8)
LYMPHOCYTES NFR BLD AUTO: 12.7 % (ref 19.6–45.3)
MCH RBC QN AUTO: 22.6 PG (ref 26.9–32)
MCHC RBC AUTO-ENTMCNC: 34.7 G/DL (ref 32.4–36.3)
MCV RBC AUTO: 65 FL (ref 80.5–98.2)
MICROCYTES BLD QL: NORMAL
MONOCYTES # BLD AUTO: 0.63 10*3/MM3 (ref 0.2–1.2)
MONOCYTES NFR BLD AUTO: 7.2 % (ref 5–12)
NEUTROPHILS # BLD AUTO: 6.34 10*3/MM3 (ref 1.9–8.1)
NEUTROPHILS NFR BLD AUTO: 72.9 % (ref 42.7–76)
NITRITE UR QL STRIP: NEGATIVE
NRBC BLD MANUAL-RTO: 0.3 /100 WBC (ref 0–0)
PH UR STRIP.AUTO: 5.5 [PH] (ref 5–8)
PLAT MORPH BLD: NORMAL
PLATELET # BLD AUTO: 342 10*3/MM3 (ref 140–500)
POTASSIUM BLD-SCNC: 4.6 MMOL/L (ref 3.5–5.2)
PROT SERPL-MCNC: 7.8 G/DL (ref 6–8.5)
PROT UR QL STRIP: ABNORMAL
PROTHROMBIN TIME: 14 SECONDS (ref 11.7–14.2)
RBC # BLD AUTO: 5.09 10*6/MM3 (ref 3.9–5.2)
RBC # UR: ABNORMAL /HPF
REF LAB TEST METHOD: ABNORMAL
SODIUM BLD-SCNC: 140 MMOL/L (ref 136–145)
SP GR UR STRIP: >=1.03 (ref 1–1.03)
SQUAMOUS #/AREA URNS HPF: ABNORMAL /HPF
TARGETS BLD QL SMEAR: NORMAL
UROBILINOGEN UR QL STRIP: ABNORMAL
WBC MORPH BLD: NORMAL
WBC NRBC COR # BLD: 8.71 10*3/MM3 (ref 4.5–10.7)
WBC UR QL AUTO: ABNORMAL /HPF

## 2017-10-09 PROCEDURE — G0378 HOSPITAL OBSERVATION PER HR: HCPCS

## 2017-10-09 PROCEDURE — 80053 COMPREHEN METABOLIC PANEL: CPT | Performed by: EMERGENCY MEDICINE

## 2017-10-09 PROCEDURE — 85007 BL SMEAR W/DIFF WBC COUNT: CPT | Performed by: EMERGENCY MEDICINE

## 2017-10-09 PROCEDURE — 85610 PROTHROMBIN TIME: CPT | Performed by: EMERGENCY MEDICINE

## 2017-10-09 PROCEDURE — 25010000002 HEPARIN (PORCINE) PER 1000 UNITS: Performed by: INTERNAL MEDICINE

## 2017-10-09 PROCEDURE — 36415 COLL VENOUS BLD VENIPUNCTURE: CPT | Performed by: EMERGENCY MEDICINE

## 2017-10-09 PROCEDURE — 25010000002 MORPHINE PER 10 MG: Performed by: INTERNAL MEDICINE

## 2017-10-09 PROCEDURE — 85025 COMPLETE CBC W/AUTO DIFF WBC: CPT | Performed by: EMERGENCY MEDICINE

## 2017-10-09 PROCEDURE — 25010000002 ONDANSETRON PER 1 MG: Performed by: INTERNAL MEDICINE

## 2017-10-09 PROCEDURE — 99284 EMERGENCY DEPT VISIT MOD MDM: CPT

## 2017-10-09 PROCEDURE — 83690 ASSAY OF LIPASE: CPT | Performed by: EMERGENCY MEDICINE

## 2017-10-09 PROCEDURE — 81001 URINALYSIS AUTO W/SCOPE: CPT | Performed by: EMERGENCY MEDICINE

## 2017-10-09 PROCEDURE — 74176 CT ABD & PELVIS W/O CONTRAST: CPT

## 2017-10-09 RX ORDER — SODIUM CHLORIDE 0.9 % (FLUSH) 0.9 %
10 SYRINGE (ML) INJECTION AS NEEDED
Status: DISCONTINUED | OUTPATIENT
Start: 2017-10-09 | End: 2017-10-19 | Stop reason: HOSPADM

## 2017-10-09 RX ORDER — MORPHINE SULFATE 2 MG/ML
2 INJECTION, SOLUTION INTRAMUSCULAR; INTRAVENOUS EVERY 4 HOURS PRN
Status: DISPENSED | OUTPATIENT
Start: 2017-10-09 | End: 2017-10-19

## 2017-10-09 RX ORDER — HEPARIN SODIUM 5000 [USP'U]/ML
5000 INJECTION, SOLUTION INTRAVENOUS; SUBCUTANEOUS EVERY 12 HOURS SCHEDULED
Status: DISCONTINUED | OUTPATIENT
Start: 2017-10-09 | End: 2017-10-19 | Stop reason: HOSPADM

## 2017-10-09 RX ORDER — HYDROCODONE BITARTRATE AND ACETAMINOPHEN 7.5; 325 MG/1; MG/1
1 TABLET ORAL EVERY 4 HOURS PRN
Status: DISPENSED | OUTPATIENT
Start: 2017-10-09 | End: 2017-10-19

## 2017-10-09 RX ORDER — ONDANSETRON 2 MG/ML
4 INJECTION INTRAMUSCULAR; INTRAVENOUS EVERY 6 HOURS PRN
Status: DISCONTINUED | OUTPATIENT
Start: 2017-10-09 | End: 2017-10-19 | Stop reason: HOSPADM

## 2017-10-09 RX ORDER — SODIUM CHLORIDE 0.9 % (FLUSH) 0.9 %
1-10 SYRINGE (ML) INJECTION AS NEEDED
Status: DISCONTINUED | OUTPATIENT
Start: 2017-10-09 | End: 2017-10-19 | Stop reason: HOSPADM

## 2017-10-09 RX ORDER — SODIUM CHLORIDE 9 MG/ML
50 INJECTION, SOLUTION INTRAVENOUS CONTINUOUS
Status: DISCONTINUED | OUTPATIENT
Start: 2017-10-09 | End: 2017-10-12

## 2017-10-09 RX ADMIN — HYDROCODONE BITARTRATE AND ACETAMINOPHEN 1 TABLET: 7.5; 325 TABLET ORAL at 21:09

## 2017-10-09 RX ADMIN — SODIUM CHLORIDE 500 ML: 9 INJECTION, SOLUTION INTRAVENOUS at 12:32

## 2017-10-09 RX ADMIN — ONDANSETRON 4 MG: 2 INJECTION INTRAMUSCULAR; INTRAVENOUS at 16:28

## 2017-10-09 RX ADMIN — HYDROCODONE BITARTRATE AND ACETAMINOPHEN 1 TABLET: 7.5; 325 TABLET ORAL at 16:29

## 2017-10-09 RX ADMIN — SODIUM CHLORIDE 100 ML/HR: 9 INJECTION, SOLUTION INTRAVENOUS at 16:29

## 2017-10-09 RX ADMIN — MORPHINE SULFATE 2 MG: 2 INJECTION, SOLUTION INTRAMUSCULAR; INTRAVENOUS at 22:31

## 2017-10-09 RX ADMIN — HEPARIN SODIUM 5000 UNITS: 5000 INJECTION, SOLUTION INTRAVENOUS; SUBCUTANEOUS at 22:29

## 2017-10-09 NOTE — ED NOTES
Called lab to add on PT/INR to existing blue top collected.  stated INR will be processed.      Kimberly Lomeli RN  10/09/17 4671

## 2017-10-09 NOTE — PLAN OF CARE
Problem: Patient Care Overview (Adult)  Goal: Plan of Care Review  Outcome: Ongoing (interventions implemented as appropriate)    10/09/17 1748   Coping/Psychosocial Response Interventions   Plan Of Care Reviewed With patient   Patient Care Overview   Progress no change   Outcome Evaluation   Outcome Summary/Follow up Plan pt arrived from ER. VSS. Reports of pain and nausea. MD notified and waiting on orders. Healed midline incision with lap x 2. all CD&I and well approximated.  at bedside.        Goal: Adult Individualization and Mutuality  Outcome: Ongoing (interventions implemented as appropriate)  Goal: Discharge Needs Assessment  Outcome: Ongoing (interventions implemented as appropriate)    Problem: Pain, Acute (Adult)  Goal: Identify Related Risk Factors and Signs and Symptoms  Outcome: Outcome(s) achieved Date Met:  10/09/17  Goal: Acceptable Pain Control/Comfort Level  Outcome: Ongoing (interventions implemented as appropriate)

## 2017-10-09 NOTE — H&P
Internal medicine history and physical   INTERNAL MEDICINE   [facility]       Patient Identification:  Name: Kimi Thomas  Age: 57 y.o.  Sex: female  :  1960  MRN: [id number]                 Primary Care Physician: Petros Roach MD                                   Chief Complaint:  Abdominal discomfort since     History of Present Illness:   Patient is a 57-year-old female with past medical history as noted below was in her usual state of her health until  after having her left nephrectomy done in August of this year.  Her surgery was performed on  for renal cell carcinoma.  The patient she had a follow-up visit with Dr. Thorpe week after surgery and everything was fine she did okay since then until  when she started noticing that her left side of her abdomen back was hurting.  Around that time she was also started on vitamin D pills which was giving her diarrhea.  Her abdominal discomfort continued to get worse to the point that she decided to come to the emergency room for further evaluation and was found to have free fluid in the peritoneal cavity of unclear etiology.  Patient is being admitted for further evaluation by urology service and we were asked to admit the patient to expedite this.  Patient says that her pain gets worse when she moves and turn.  Patient denies any fever or denies any chills denies any nausea vomiting or diarrhea except when she took the vitamin D pills.  She denies any rash.  She denies any exposure to sick contact.  She denies taking any new antibiotics recently.  Apparently her nephrectomy was total/radical nephrectomy performed via cup ascorbic approach.      Past Medical History:  Past Medical History:   Diagnosis Date   • Kidney tumor      Past Surgical History:  Past Surgical History:   Procedure Laterality Date   • BREAST LUMPECTOMY     • HYSTERECTOMY     • KNEE ARTHROSCOPY     •  "NEPHRECTOMY Left 8/28/2017    Procedure: HAND ASSISTED LEFT LAPAROSCOPIC NEPHRECTOMY ;  Surgeon: Sarthak Thorpe Jr., MD;  Location: [facility] OR;  Service:       Home Meds:  No prescriptions prior to admission.     Current Meds:     Current Facility-Administered Medications:   •  HYDROcodone-acetaminophen (NORCO) 7.5-325 MG per tablet 1 tablet, 1 tablet, Oral, Q4H PRN, Vilma Ann MD, 1 tablet at 10/09/17 1629  •  morphine injection 2 mg, 2 mg, Intravenous, Q4H PRN, Vilma Ann MD  •  ondansetron (ZOFRAN) injection 4 mg, 4 mg, Intravenous, Q6H PRN, Vilma Ann MD, 4 mg at 10/09/17 1628  •  sodium chloride 0.9 % flush 10 mL, 10 mL, Intravenous, PRN, Anurag Green MD  •  sodium chloride 0.9 % infusion, 100 mL/hr, Intravenous, Continuous, Vilma Ann MD, Last Rate: 100 mL/hr at 10/09/17 1629, 100 mL/hr at 10/09/17 1629  Allergies:  Allergies   Allergen Reactions   • Oxycodone Swelling     Social History:   Social History   Substance Use Topics   • Smoking status: Former Smoker     Packs/day: 0.50     Years: 30.00     Types: Cigarettes     Quit date: 8/25/2017   • Smokeless tobacco: Never Used      Comment: Last cigarette 8/25/17   • Alcohol use No      Family History:  Family History   Problem Relation Age of Onset   • Malig Hyperthermia Neg Hx           Review of Systems  See history of present illness and past medical history.  Constitutional: Positive for appetite change (decreased) and fatigue. Negative for fever.        \"feeling ill\"   HENT: Negative for sore throat.    Eyes: Negative.    Respiratory: Negative for cough and shortness of breath.    Cardiovascular: Negative for chest pain.   Gastrointestinal: Positive for abdominal pain. Negative for diarrhea and vomiting.   Genitourinary: Positive for dysuria and flank pain.   Musculoskeletal: Negative for neck pain.   Skin: Negative for rash.   Allergic/Immunologic: Negative.    Neurological: Negative for weakness, numbness and headaches. " "  Hematological: Negative.    Psychiatric/Behavioral: Negative    Vitals:   /76 (BP Location: Left arm, Patient Position: Lying)  Pulse 78  Temp 98.3 °F (36.8 °C) (Oral)   Resp 18  Ht 60\" (152.4 cm)  Wt 156 lb (70.8 kg)  SpO2 98%  BMI 30.47 kg/m2  I/O:   Intake/Output Summary (Last 24 hours) at 10/09/17 1934  Last data filed at 10/09/17 1818   Gross per 24 hour   Intake              210 ml   Output                0 ml   Net              210 ml     Exam:  General Appearance:    Alert, cooperative, no distress, appears stated age, Does not appear to be toxic or septic.     Head:    Normocephalic, without obvious abnormality, atraumatic   Eyes:    PERRL, conjunctiva/corneas clear, EOM's intact, both eyes   Ears:    Normal external ear canals, both ears   Nose:   Nares normal, septum midline, mucosa normal, no drainage    or sinus tenderness   Throat:   Lips, tongue, gums normal; oral mucosa pink and moist   Neck:   Supple, symmetrical, trachea midline, no adenopathy;     thyroid:  no enlargement/tenderness/nodules; no carotid    bruit or JVD   Back:     Symmetric, no curvature, ROM normal, no CVA tenderness   Lungs:     Clear to auscultation bilaterally, respirations unlabored   Chest Wall:    No tenderness or deformity    Heart:    Regular rate and rhythm, S1 and S2 normal, no murmur, rub   or gallop   Abdomen:     Abdominal examination is remarkable for soft abdomen no rebound tenderness midline incision is healed no obvious rash on the abdomen noted    Extremities:   Extremities normal, atraumatic, no cyanosis or edema   Pulses:   Pulses palpable in all extremities; symmetric all extremities   Skin:   Skin color normal, Skin is warm and dry,  no rashes or palpable lesions   Neurologic:   CNII-XII intact, motor strength grossly intact, sensation grossly intact to light touch, no focal deficits noted       Data Review:      I reviewed the patient's new clinical results.    Results from last 7 days  Lab " Units 10/09/17  1102   WBC 10*3/mm3 8.71   HEMOGLOBIN g/dL 11.5*   PLATELETS 10*3/mm3 342       Results from last 7 days  Lab Units 10/09/17  1102   SODIUM mmol/L 140   POTASSIUM mmol/L 4.6   CHLORIDE mmol/L 100   CO2 mmol/L 26.6   BUN mg/dL 16   CREATININE mg/dL 1.39*   CALCIUM mg/dL 10.5   GLUCOSE mg/dL 105*   RADIOLOGY  CT Abdomen Pelvis Without Contrast   Preliminary Result   1. Status post left nephrectomy. The midleft colon demonstrates wall   thickening within the operative bed. This finding is likely   postprocedural. There is moderate intra-abdominal fluid that   demonstrates relatively low attenuation values ranging from 15 to 25   Hounsfield units.    2. Bilateral small layering effusions with bibasilar atelectasis within   the lower lobes.   3. Small hypodense liver lesion. The imaging findings are nonspecific   and correlation with remote imaging would be most helpful.         Assessment:  Active Hospital Problems (** Indicates Principal Problem)    Diagnosis Date Noted   • **Generalized abdominal pain [R10.84] 10/09/2017   • Status post nephrectomy [Z90.5] 10/09/2017   • GILBERTO (acute kidney injury) [N17.9] 10/09/2017   • History of renal cell carcinoma [Z85.528] 10/09/2017   Mid descending colon wall thickening    Resolved Hospital Problems    Diagnosis Date Noted Date Resolved   No resolved problems to display.       Plan:  Admit the patient, manage her pain, consult urology service Dr. Thorpe to comment on this left-sided abdominal discomfort following nephrectomy in August of this year.  He will leave it up to Dr. Thorpe if she can be allowed to eat and make a decision whether this fluid in her abdomen needs to be tapped.  See orders.    Vilma Ann MD   10/9/2017  7:34 PM  Much of this encounter note is an electronic transcription/translation of spoken language to printed text. The electronic translation of spoken language may permit erroneous, or at times, nonsensical words or phrases to be  inadvertently transcribed; Although I have reviewed the note for such errors, some may still exist

## 2017-10-09 NOTE — ED PROVIDER NOTES
" EMERGENCY DEPARTMENT ENCOUNTER    CHIEF COMPLAINT  Chief Complaint: abdominal pain  History given by: Patient  History limited by: Nothing  Room Number: 27/27  PMD: Petros Roach MD      HPI:  Pt is a 57 y.o. female who presents complaining of persistent abdominal pain and left flank pain for the past two weeks s/p nephrectomy 8/28 performed by . Pt began experiencing diffuse abdominal \"cramps / bloating\" about three weeks after her surgery which has not improved. She is has also experienced dysuria but denies any fever, chills, nausea, vomiting or diarrhea since onset. The abdominal / flank pain is exacerbated by movement and is alleviated by positional rest. This has continued to worsen and she is now feeling generally ill, fatigued, and has a decreased appetite. No other complaints at this time.    Duration:  Two weeks  Onset: gradual  Timing: constant  Location: abdomen, left flank  Radiation: none  Quality: \"pain\"  Intensity/Severity: moderate  Progression: worsening  Associated Symptoms: ill feeling, fatigue, decreased appetite, dysuria  Aggravating Factors: eating, movement  Alleviating Factors: positional rest  Previous Episodes: none  Treatment before arrival: none    PAST MEDICAL HISTORY  Active Ambulatory Problems     Diagnosis Date Noted   • Left renal mass 08/28/2017     Resolved Ambulatory Problems     Diagnosis Date Noted   • No Resolved Ambulatory Problems     Past Medical History:   Diagnosis Date   • Kidney tumor        PAST SURGICAL HISTORY  Past Surgical History:   Procedure Laterality Date   • BREAST LUMPECTOMY     • HYSTERECTOMY     • KNEE ARTHROSCOPY     • NEPHRECTOMY Left 8/28/2017    Procedure: HAND ASSISTED LEFT LAPAROSCOPIC NEPHRECTOMY ;  Surgeon: Sarthak Thorpe Jr., MD;  Location: [facility];  Service:        FAMILY HISTORY  Family History   Problem Relation Age of Onset   • Malig Hyperthermia Neg Hx        SOCIAL HISTORY  Social History     Social History   • " "Marital status:      Spouse name: N/A   • Number of children: N/A   • Years of education: N/A     Occupational History   • Not on file.     Social History Main Topics   • Smoking status: Current Every Day Smoker     Packs/day: 0.50     Years: 30.00     Types: Cigarettes   • Smokeless tobacco: Not on file      Comment: Last cigarette 8/25/17   • Alcohol use No   • Drug use: No   • Sexual activity: Defer     Other Topics Concern   • Not on file     Social History Narrative       ALLERGIES  Oxycodone    REVIEW OF SYSTEMS  Review of Systems   Constitutional: Positive for appetite change (decreased) and fatigue. Negative for fever.        \"feeling ill\"   HENT: Negative for sore throat.    Eyes: Negative.    Respiratory: Negative for cough and shortness of breath.    Cardiovascular: Negative for chest pain.   Gastrointestinal: Positive for abdominal pain. Negative for diarrhea and vomiting.   Genitourinary: Positive for dysuria and flank pain.   Musculoskeletal: Negative for neck pain.   Skin: Negative for rash.   Allergic/Immunologic: Negative.    Neurological: Negative for weakness, numbness and headaches.   Hematological: Negative.    Psychiatric/Behavioral: Negative.    All other systems reviewed and are negative.      PHYSICAL EXAM  ED Triage Vitals   Temp Heart Rate Resp BP SpO2   10/09/17 0932 10/09/17 0932 10/09/17 0932 10/09/17 1133 10/09/17 0932   99 °F (37.2 °C) 123 16 106/73 97 %      Temp src Heart Rate Source Patient Position BP Location FiO2 (%)   10/09/17 0932 -- -- -- --   Tympanic           Physical Exam   Constitutional: She is oriented to person, place, and time and well-developed, well-nourished, and in no distress. No distress.   Pt appears un-well.    HENT:   Head: Normocephalic and atraumatic.   Eyes: EOM are normal. Pupils are equal, round, and reactive to light.   Neck: Normal range of motion. Neck supple.   Cardiovascular: Normal rate, regular rhythm and normal heart sounds.  "   Pulmonary/Chest: Effort normal and breath sounds normal. No respiratory distress.   CTAB   Abdominal: Soft. Bowel sounds are hypoactive. There is generalized tenderness. There is guarding. There is no rebound.   Musculoskeletal: Normal range of motion. She exhibits no edema.   No pedal edema, no calf tenderness.   Neurological: She is alert and oriented to person, place, and time. She has normal sensation and normal strength.   Skin: Skin is warm and dry. No rash noted.   anterior incision looks clean and dry and healing well.   Psychiatric: Mood and affect normal.   Nursing note and vitals reviewed.      LAB RESULTS  Lab Results (last 24 hours)     Procedure Component Value Units Date/Time    CBC & Differential [[id number]] Collected:  10/09/17 1102    Specimen:  Blood Updated:  10/09/17 1146    Narrative:       The following orders were created for panel order CBC & Differential.  Procedure                               Abnormality         Status                     ---------                               -----------         ------                     Scan Slide[[id number]]                                       Final result               CBC Auto Differential[[id number]]        Abnormal            Final result                 Please view results for these tests on the individual orders.    Comprehensive Metabolic Panel [[id number]]  (Abnormal) Collected:  10/09/17 1102    Specimen:  Blood Updated:  10/09/17 1151     Glucose 105 (H) mg/dL      BUN 16 mg/dL      Creatinine 1.39 (H) mg/dL      Sodium 140 mmol/L      Potassium 4.6 mmol/L      Chloride 100 mmol/L      CO2 26.6 mmol/L      Calcium 10.5 mg/dL      Total Protein 7.8 g/dL      Albumin 4.20 g/dL      ALT (SGPT) 7 U/L      AST (SGOT) 9 U/L      Alkaline Phosphatase 96 U/L      Total Bilirubin 0.5 mg/dL      eGFR   Amer 48 (L) mL/min/1.73      Globulin 3.6 gm/dL      A/G Ratio 1.2 g/dL      BUN/Creatinine Ratio 11.5     Anion Gap 13.4 mmol/L     Lipase  [[id number]]  (Abnormal) Collected:  10/09/17 1102    Specimen:  Blood Updated:  10/09/17 1151     Lipase 12 (L) U/L     Urinalysis With / Culture If Indicated - Urine, Clean Catch [[id number]]  (Abnormal) Collected:  10/09/17 1102    Specimen:  Urine from Urine, Clean Catch Updated:  10/09/17 1136     Color, UA Tari (A)     Appearance, UA Cloudy (A)     pH, UA 5.5     Specific Gravity, UA >=1.030     Glucose, UA Negative     Ketones, UA Trace (A)     Bilirubin, UA Negative     Blood, UA Small (1+) (A)     Protein, UA 30 mg/dL (1+) (A)     Leuk Esterase, UA Negative     Nitrite, UA Negative     Urobilinogen, UA 1.0 E.U./dL    CBC Auto Differential [[id number]]  (Abnormal) Collected:  10/09/17 1102    Specimen:  Blood Updated:  10/09/17 1146     WBC 8.71 10*3/mm3      RBC 5.09 10*6/mm3      Hemoglobin 11.5 (L) g/dL      Hematocrit 33.1 (L) %      MCV 65.0 (L) fL      MCH 22.6 (L) pg      MCHC 34.7 g/dL      RDW 20.3 (H) %      RDW-SD 47.2 fl      Platelets 342 10*3/mm3      Neutrophil % 72.9 %      Lymphocyte % 12.7 (L) %      Monocyte % 7.2 %      Eosinophil % 6.3 (H) %      Basophil % 0.6 %      Immature Grans % 0.3 %      Neutrophils, Absolute 6.34 10*3/mm3      Lymphocytes, Absolute 1.11 10*3/mm3      Monocytes, Absolute 0.63 10*3/mm3      Eosinophils, Absolute 0.55 10*3/mm3      Basophils, Absolute 0.05 10*3/mm3      Immature Grans, Absolute 0.03 10*3/mm3      nRBC 0.3 (H) /100 WBC     Scan Slide [[id number]] Collected:  10/09/17 1102    Specimen:  Blood Updated:  10/09/17 1146     Anisocytosis Mod/2+     Hypochromia Mod/2+     Microcytes Large/3+     Target Cells Mod/2+     WBC Morphology Normal     Platelet Morphology Normal    Urinalysis, Microscopic Only - Urine, Clean Catch [[id number]]  (Abnormal) Collected:  10/09/17 1102    Specimen:  Urine from Urine, Clean Catch Updated:  10/09/17 1156     RBC, UA 3-5 (A) /HPF      WBC, UA 0-2 /HPF      Bacteria, UA 1+ (A) /HPF      Squamous Epithelial Cells, UA 0-2 /HPF       Hyaline Casts, UA 0-2 /LPF      Calcium Oxalate Crystals, UA Moderate/2+ /HPF      Methodology Manual Light Microscopy    Protime-INR [[id number]]  (Abnormal) Collected:  10/09/17 1240    Specimen:  Blood Updated:  10/09/17 1254     Protime 14.0 Seconds      INR 1.12 (H)          I ordered the above labs and reviewed the results    RADIOLOGY  CT Abdomen Pelvis Without Contrast   Preliminary Result   1. Status post left nephrectomy. The midleft colon demonstrates wall   thickening within the operative bed. This finding is likely   postprocedural. There is moderate intra-abdominal fluid that   demonstrates relatively low attenuation values ranging from 15 to 25   Hounsfield units.    2. Bilateral small layering effusions with bibasilar atelectasis within   the lower lobes.   3. Small hypodense liver lesion. The imaging findings are nonspecific   and correlation with remote imaging would be most helpful.       These findings were discussed with Dr. Green by telephone at 12:28 PM   on 10/09/2017.       Radiation dose reduction techniques were utilized, including automated   exposure control and exposure modulation based on body size.                 Reviewed CT abd/pel which shows that there is a moderate amount of free fluid in her abdomen. The fluid is thicker than water but thinner than blood. Independently viewed by me. Interpreted by radiologist. Discussed with .      I ordered the above noted radiological studies. Interpreted by radiologist. Discussed with radiologist (Dr. Chandler). Reviewed by me in PACS.       PROCEDURES  Procedures      PROGRESS AND CONSULTS  ED Course     1134  Ordered CT Abd/pelvis for further evaluation.     1205  Ordered IV fluids for hydration.     1229  Placed call to Dr Thorpe (urology) for consult.     1259  Discussed Pt's case with Dr. Thorpe (urology) and informed him of the fluid in her abdomen. I offered to admit patient, but he suggested discharge the patient and he  will follow up with her in the office.    1307  Rechecked Pt and Pt is resting comfortably. Discussed with Pt the results of her CT abd/pel which shows moderate amount of free fluid in the abd. Informed Pt that I talked with Dr. Thorpe who believes that this will resolve with time and he recommends monitoring the situation and get a repeat CT scan if necessary after a follow up in his office. I informed Pt that we are unsure of the identity of the fluid in her abdomen and discussed with Pt the possibility of getting an ultrasound to tap fluid to analyze it. Pt says that she has been experiencing sharp pain since being in the ER and that she has been experiencing trouble sleeping due to discomfort. I informed Pt that I would be willing to call Dr. Thorpe again to discuss possibility of admission to determine what the fluid is and to monitor worsening symptoms.     1322  Discussed patient's case with Dr. Thorpe, including Pt's concerns about going home in such discomfort. Dr Thorpe agrees to admit patient to a med-surg bed.    1333  Consulted with Dr. Thorpe again who defers admission to medicine. Placed call to [facility] for admission.      1418  Discussed with Dr. Ann who agrees to admit Pt to a med-surg bed.         MEDICAL DECISION MAKING  Results were reviewed/discussed with the patient and they were also made aware of online access. Pt also made aware that some labs, such as cultures, will not be resulted during ER visit and follow up with PMD is necessary.     MDM  Number of Diagnoses or Management Options  Free fluid in pelvis and abdomen:   Generalized abdominal pain:      Amount and/or Complexity of Data Reviewed  Clinical lab tests: ordered and reviewed (Creatinine= 1.39, UA bacteria= 1+)  Tests in the radiology section of CPT®: ordered and reviewed ( CT abd/pel shows fluid in her abdomen. The fluid is thicker than water but thinner than blood)  Discussion of test results with the performing providers: yes (  Ramesh (radiology))  Decide to obtain previous medical records or to obtain history from someone other than the patient: yes  Discuss the patient with other providers: yes (Discussed Pt's case with Dr. Thorpe (urology)           DIAGNOSIS  Final diagnoses:   Generalized abdominal pain   Free fluid in pelvis and abdomen       DISPOSITION  ADMISSION    Discussed treatment plan and reason for admission with pt/family and admitting physician.  Pt/family voiced understanding of the plan for admission for further testing/treatment as needed.         Latest Documented Vital Signs:  As of 2:20 PM  BP- 123/70 HR- 96 Temp- 99 °F (37.2 °C) (Tympanic) O2 sat- 99%    --  Documentation assistance provided by eric Cantu for Dr. Green.  Information recorded by the scribe was done at my direction and has been verified and validated by me.        Tran Cantu  10/09/17 1420       Zeferino Guadarrama  10/09/17 1438       Anurag Green MD  10/09/17 1957

## 2017-10-10 ENCOUNTER — APPOINTMENT (OUTPATIENT)
Dept: GENERAL RADIOLOGY | Facility: HOSPITAL | Age: 57
End: 2017-10-10
Attending: INTERNAL MEDICINE

## 2017-10-10 PROBLEM — K56.7 ILEUS (HCC): Status: ACTIVE | Noted: 2017-10-10

## 2017-10-10 PROBLEM — E86.0 DEHYDRATION: Status: ACTIVE | Noted: 2017-10-10

## 2017-10-10 LAB
ABO GROUP BLD: NORMAL
ALBUMIN SERPL-MCNC: 3.3 G/DL (ref 3.5–5.2)
ALBUMIN/GLOB SERPL: 1.2 G/DL
ALP SERPL-CCNC: 73 U/L (ref 39–117)
ALT SERPL W P-5'-P-CCNC: <5 U/L (ref 1–33)
ANION GAP SERPL CALCULATED.3IONS-SCNC: 10.1 MMOL/L
AST SERPL-CCNC: 6 U/L (ref 1–32)
BASOPHILS # BLD AUTO: 0.02 10*3/MM3 (ref 0–0.2)
BASOPHILS NFR BLD AUTO: 0.3 % (ref 0–1.5)
BILIRUB SERPL-MCNC: 0.4 MG/DL (ref 0.1–1.2)
BLD GP AB SCN SERPL QL: NEGATIVE
BUN BLD-MCNC: 15 MG/DL (ref 6–20)
BUN/CREAT SERPL: 12.6 (ref 7–25)
CALCIUM SPEC-SCNC: 9.2 MG/DL (ref 8.6–10.5)
CHLORIDE SERPL-SCNC: 104 MMOL/L (ref 98–107)
CO2 SERPL-SCNC: 24.9 MMOL/L (ref 22–29)
CREAT BLD-MCNC: 1.19 MG/DL (ref 0.57–1)
DEPRECATED RDW RBC AUTO: 47 FL (ref 37–54)
EOSINOPHIL # BLD AUTO: 0.42 10*3/MM3 (ref 0–0.7)
EOSINOPHIL NFR BLD AUTO: 7.3 % (ref 0.3–6.2)
ERYTHROCYTE [DISTWIDTH] IN BLOOD BY AUTOMATED COUNT: 19.9 % (ref 11.7–13)
GFR SERPL CREATININE-BSD FRML MDRD: 57 ML/MIN/1.73
GLOBULIN UR ELPH-MCNC: 2.8 GM/DL
GLUCOSE BLD-MCNC: 85 MG/DL (ref 65–99)
HCT VFR BLD AUTO: 25.5 % (ref 35.6–45.5)
HCT VFR BLD AUTO: 25.7 % (ref 35.6–45.5)
HGB BLD-MCNC: 8.7 G/DL (ref 11.9–15.5)
HGB BLD-MCNC: 8.9 G/DL (ref 11.9–15.5)
IMM GRANULOCYTES # BLD: 0 10*3/MM3 (ref 0–0.03)
IMM GRANULOCYTES NFR BLD: 0 % (ref 0–0.5)
LYMPHOCYTES # BLD AUTO: 1.03 10*3/MM3 (ref 0.9–4.8)
LYMPHOCYTES NFR BLD AUTO: 17.9 % (ref 19.6–45.3)
MCH RBC QN AUTO: 21.8 PG (ref 26.9–32)
MCHC RBC AUTO-ENTMCNC: 33.9 G/DL (ref 32.4–36.3)
MCV RBC AUTO: 64.3 FL (ref 80.5–98.2)
MONOCYTES # BLD AUTO: 0.64 10*3/MM3 (ref 0.2–1.2)
MONOCYTES NFR BLD AUTO: 11.1 % (ref 5–12)
NEUTROPHILS # BLD AUTO: 3.65 10*3/MM3 (ref 1.9–8.1)
NEUTROPHILS NFR BLD AUTO: 63.2 % (ref 42.7–76)
NRBC BLD MANUAL-RTO: 0.8 /100 WBC (ref 0–0)
PLATELET # BLD AUTO: 274 10*3/MM3 (ref 140–500)
POTASSIUM BLD-SCNC: 4.4 MMOL/L (ref 3.5–5.2)
PROT SERPL-MCNC: 6.1 G/DL (ref 6–8.5)
RBC # BLD AUTO: 4 10*6/MM3 (ref 3.9–5.2)
RH BLD: POSITIVE
SODIUM BLD-SCNC: 139 MMOL/L (ref 136–145)
WBC NRBC COR # BLD: 5.77 10*3/MM3 (ref 4.5–10.7)

## 2017-10-10 PROCEDURE — 74000 HC ABDOMEN KUB: CPT

## 2017-10-10 PROCEDURE — 85025 COMPLETE CBC W/AUTO DIFF WBC: CPT | Performed by: INTERNAL MEDICINE

## 2017-10-10 PROCEDURE — 25010000002 ONDANSETRON PER 1 MG: Performed by: INTERNAL MEDICINE

## 2017-10-10 PROCEDURE — 86900 BLOOD TYPING SEROLOGIC ABO: CPT | Performed by: INTERNAL MEDICINE

## 2017-10-10 PROCEDURE — 85018 HEMOGLOBIN: CPT | Performed by: INTERNAL MEDICINE

## 2017-10-10 PROCEDURE — 86850 RBC ANTIBODY SCREEN: CPT | Performed by: INTERNAL MEDICINE

## 2017-10-10 PROCEDURE — 85014 HEMATOCRIT: CPT | Performed by: INTERNAL MEDICINE

## 2017-10-10 PROCEDURE — 25010000002 HEPARIN (PORCINE) PER 1000 UNITS: Performed by: INTERNAL MEDICINE

## 2017-10-10 PROCEDURE — 80053 COMPREHEN METABOLIC PANEL: CPT | Performed by: INTERNAL MEDICINE

## 2017-10-10 PROCEDURE — 86901 BLOOD TYPING SEROLOGIC RH(D): CPT | Performed by: INTERNAL MEDICINE

## 2017-10-10 RX ORDER — BISACODYL 10 MG
10 SUPPOSITORY, RECTAL RECTAL 2 TIMES DAILY
Status: DISCONTINUED | OUTPATIENT
Start: 2017-10-10 | End: 2017-10-13

## 2017-10-10 RX ORDER — SENNA AND DOCUSATE SODIUM 50; 8.6 MG/1; MG/1
2 TABLET, FILM COATED ORAL 2 TIMES DAILY
Status: DISCONTINUED | OUTPATIENT
Start: 2017-10-10 | End: 2017-10-12

## 2017-10-10 RX ORDER — SENNA AND DOCUSATE SODIUM 50; 8.6 MG/1; MG/1
2 TABLET, FILM COATED ORAL NIGHTLY
Status: DISCONTINUED | OUTPATIENT
Start: 2017-10-10 | End: 2017-10-10

## 2017-10-10 RX ADMIN — HEPARIN SODIUM 5000 UNITS: 5000 INJECTION, SOLUTION INTRAVENOUS; SUBCUTANEOUS at 08:23

## 2017-10-10 RX ADMIN — HYDROCODONE BITARTRATE AND ACETAMINOPHEN 1 TABLET: 7.5; 325 TABLET ORAL at 01:52

## 2017-10-10 RX ADMIN — SENNOSIDES AND DOCUSATE SODIUM 2 TABLET: 8.6; 5 TABLET ORAL at 18:08

## 2017-10-10 RX ADMIN — ONDANSETRON 4 MG: 2 INJECTION INTRAMUSCULAR; INTRAVENOUS at 15:46

## 2017-10-10 RX ADMIN — BISACODYL 10 MG: 10 SUPPOSITORY RECTAL at 18:05

## 2017-10-10 RX ADMIN — SODIUM CHLORIDE 100 ML/HR: 9 INJECTION, SOLUTION INTRAVENOUS at 22:00

## 2017-10-10 RX ADMIN — BISACODYL 10 MG: 10 SUPPOSITORY RECTAL at 08:23

## 2017-10-10 RX ADMIN — SODIUM CHLORIDE 100 ML/HR: 9 INJECTION, SOLUTION INTRAVENOUS at 01:49

## 2017-10-10 RX ADMIN — HYDROCODONE BITARTRATE AND ACETAMINOPHEN 1 TABLET: 7.5; 325 TABLET ORAL at 10:15

## 2017-10-10 RX ADMIN — HYDROCODONE BITARTRATE AND ACETAMINOPHEN 1 TABLET: 7.5; 325 TABLET ORAL at 05:38

## 2017-10-10 RX ADMIN — SODIUM CHLORIDE 100 ML/HR: 9 INJECTION, SOLUTION INTRAVENOUS at 12:08

## 2017-10-10 RX ADMIN — HYDROCODONE BITARTRATE AND ACETAMINOPHEN 1 TABLET: 7.5; 325 TABLET ORAL at 22:00

## 2017-10-10 NOTE — PLAN OF CARE
Problem: Patient Care Overview (Adult)  Goal: Plan of Care Review  Outcome: Ongoing (interventions implemented as appropriate)    10/10/17 1541   Coping/Psychosocial Response Interventions   Plan Of Care Reviewed With patient   Patient Care Overview   Progress improving   Outcome Evaluation   Outcome Summary/Follow up Plan pt being treated for ileus, KUB ordered, pain and nausea meds given, amb in [facility], diet decreased to full liquids, hmg drop noted-labs ordered to recheck, IVF infusing, VSS       Goal: Adult Individualization and Mutuality  Outcome: Ongoing (interventions implemented as appropriate)  Goal: Discharge Needs Assessment  Outcome: Ongoing (interventions implemented as appropriate)    Problem: Pain, Acute (Adult)  Goal: Acceptable Pain Control/Comfort Level  Outcome: Ongoing (interventions implemented as appropriate)

## 2017-10-10 NOTE — PROGRESS NOTES
"     LOS: 0 days   Primary Care Physician: Petros Roach MD     Subjective   Bowels moved very little after a suppository.  Check cramping with that.  She's have nausea when she is tried to eat.  She is going to try soup later.  She feels very bloated.  No PND or orthopnea    Vital Signs  Body mass index is 30.47 kg/(m^2).  Temp:  [97.1 °F (36.2 °C)-98.3 °F (36.8 °C)] 98.1 °F (36.7 °C)  Heart Rate:  [78-92] 81  Resp:  [18] 18  BP: ()/(51-76) 105/58      Objective:  General Appearance:  Uncomfortable and in no acute distress.    Vital signs: (most recent): Blood pressure 105/58, pulse 81, temperature 98.1 °F (36.7 °C), temperature source Oral, resp. rate 18, height 60\" (152.4 cm), weight 156 lb (70.8 kg), SpO2 96 %.    HEENT: (No JVD.  Neck supple.  No lymphadenopathy.  Trachea midline)    Lungs:  There are decreased breath sounds.  No wheezes, rales or rhonchi.    Heart: Normal rate.  Regular rhythm.  No murmur.   Abdomen: Abdomen is soft and distended.  (Tympanitic)Bowel sounds are normal.   There is generalized tenderness.  There is no rebound tenderness.   There is no splenomegaly. There is no hepatomegaly.   Extremities: There is no dependent edema.    Neurological: Patient is alert.          Results Review:    I reviewed the patient's new clinical results.      Results from last 7 days  Lab Units 10/10/17  0522 10/09/17  1102   WBC 10*3/mm3 5.77 8.71   HEMOGLOBIN g/dL 8.7* 11.5*   PLATELETS 10*3/mm3 274 342       Results from last 7 days  Lab Units 10/10/17  0521 10/09/17  1102   SODIUM mmol/L 139 140   POTASSIUM mmol/L 4.4 4.6   CHLORIDE mmol/L 104 100   CO2 mmol/L 24.9 26.6   BUN mg/dL 15 16   CREATININE mg/dL 1.19* 1.39*   CALCIUM mg/dL 9.2 10.5   GLUCOSE mg/dL 85 105*       Results from last 7 days  Lab Units 10/09/17  1240   INR  1.12*     Hemoglobin A1C:No results found for: HGBA1C    Glucose Range:No results found for: POCGLU    No results found for: [id number]    No results found for: " TSH    Assessment & Plan      Medication Review: Yes    Active Hospital Problems (** Indicates Principal Problem)    Diagnosis Date Noted   • **Generalized abdominal pain [R10.84] 10/09/2017   • Ileus [K56.7] 10/10/2017   • Dehydration [E86.0] 10/10/2017   • Status post nephrectomy [Z90.5] 10/09/2017   • GILBERTO (acute kidney injury) [N17.9] 10/09/2017   • History of renal cell carcinoma [Z85.528] 10/09/2017      Resolved Hospital Problems    Diagnosis Date Noted Date Resolved   No resolved problems to display.       Assessment/Plan  1.  Abdominal pain with findings consistent with ileus.  I ordered a KUB for today and for the morning.  Change diet to liquids.  Results of CT noted.  Some small amount of fluid as well as thickening of part of the colon.  No leukocytosis or fever.  Symptoms started with vitamin D.  Vitamin D can cause constipation with nausea or vomiting.  Will have her stay off vitamin D.  2.  Acute blood loss anemia.  Urine was very concentrated on admission so there is some dilutional component.  Repeat hemoglobin this afternoon to make sure she is not still dropping.  3.  Left nephrectomy September 2017.  Some postoperative changes.  Dr. Thorpe's input noted.  4.  Renal insufficiency.  Creatinine has decreased after IV fluids.  Recheck in a.m.  Dehydration as noted above.  No evidence of volume excess on exam.    Discussed with patient's nurse.  Medical record reviewed.    Peg Arreguin MD  10/10/17  2:56 PM

## 2017-10-10 NOTE — CONSULTS
FIRST UROLOGY CONSULT      Patient Identification:  NAME:  Kimi Thomas  Age:  57 y.o.   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 Postoperatively she did well, and postoperatively she had returned to normal two weeks after surgery. She now presents with crampy abd pain, abdominal bloating, poor appetite and activity level. Minimal nausea, no vomiting.  No fevers or chills. No GH.    CT abd/pelvis reviewed by me: Free fluid within the peritoneal cavity. Left colon thickening, likely postsurgical changes.       Past medical history:  Past Medical History:   Diagnosis Date   • Kidney tumor        Past surgical history:  Past Surgical History:   Procedure Laterality Date   • BREAST LUMPECTOMY     • HYSTERECTOMY     • KNEE ARTHROSCOPY     • NEPHRECTOMY Left 2017    Procedure: HAND ASSISTED LEFT LAPAROSCOPIC NEPHRECTOMY ;  Surgeon: Sarthak Thorpe Jr., MD;  Location: [facility];  Service:        Allergies:  Oxycodone    Home medications:  No prescriptions prior to admission.       Hospital medications:    heparin (porcine) 5,000 Units Subcutaneous Q12H       sodium chloride 100 mL/hr Last Rate: 100 mL/hr (10/10/17 0149)     HYDROcodone-acetaminophen  •  Morphine  •  ondansetron  •  sodium chloride  •  sodium chloride    Family history:  Family History   Problem Relation Age of Onset   • Malig Hyperthermia Neg Hx        Social history:  Social History   Substance Use Topics   • Smoking status: Former Smoker     Packs/day: 0.50     Years: 30.00     Types: Cigarettes     Quit date: 2017   • Smokeless tobacco: Never Used      Comment: Last cigarette 17   • Alcohol use No       Review of systems:    Negative 12-system ROS except for the following:  As above      Objective:  TMax 24 hours:   Temp (24hrs), Av.1 °F  (36.7 °C), Min:97.1 °F (36.2 °C), Max:99 °F (37.2 °C)      Vitals Ranges:   Temp:  [97.1 °F (36.2 °C)-99 °F (37.2 °C)] 97.1 °F (36.2 °C)  Heart Rate:  [] 83  Resp:  [16-18] 18  BP: ()/(57-94) 92/61    Intake/Output Last 3 shifts:  I/O last 3 completed shifts:  In: 1141.7 [P.O.:210; I.V.:931.7]  Out: 700 [Urine:700]     Physical Exam:       General Appearance:    Alert, cooperative, in no acute distress   Head:    Normocephalic, without obvious abnormality, atraumatic   Eyes:          PERRL, conjunctivae and corneas clear   Ears:    Normal external inspection       Neck:   Supple, no LAD, trachea midline   Back:     No CVA tenderness   Lungs:     Respirations unlabored, symmetric excursion       Abdomen:     Soft, moderately to severely distended   :    No pelvic examination performed   Extremities:   No edema, no deformity   Skin:   No bleeding, bruising or rashes   Neuro/Psych:   Orientation intact, mood/affect pleasant, no focal findings       Results review:   I reviewed the patient's new clinical results.    Data review:  Lab Results (last 24 hours)     Procedure Component Value Units Date/Time    Urinalysis With / Culture If Indicated - Urine, Clean Catch [[id number]]  (Abnormal) Collected:  10/09/17 1102    Specimen:  Urine from Urine, Clean Catch Updated:  10/09/17 1136     Color, UA Tari (A)     Appearance, UA Cloudy (A)     pH, UA 5.5     Specific Gravity, UA >=1.030     Glucose, UA Negative     Ketones, UA Trace (A)     Bilirubin, UA Negative     Blood, UA Small (1+) (A)     Protein, UA 30 mg/dL (1+) (A)     Leuk Esterase, UA Negative     Nitrite, UA Negative     Urobilinogen, UA 1.0 E.U./dL    CBC & Differential [[id number]] Collected:  10/09/17 1102    Specimen:  Blood Updated:  10/09/17 1146    Narrative:       The following orders were created for panel order CBC & Differential.  Procedure                               Abnormality         Status                     ---------                                -----------         ------                     Scan Slide[[id number]]                                       Final result               CBC Auto Differential[[id number]]        Abnormal            Final result                 Please view results for these tests on the individual orders.    CBC Auto Differential [[id number]]  (Abnormal) Collected:  10/09/17 1102    Specimen:  Blood Updated:  10/09/17 1146     WBC 8.71 10*3/mm3      RBC 5.09 10*6/mm3      Hemoglobin 11.5 (L) g/dL      Hematocrit 33.1 (L) %      MCV 65.0 (L) fL      MCH 22.6 (L) pg      MCHC 34.7 g/dL      RDW 20.3 (H) %      RDW-SD 47.2 fl      Platelets 342 10*3/mm3      Neutrophil % 72.9 %      Lymphocyte % 12.7 (L) %      Monocyte % 7.2 %      Eosinophil % 6.3 (H) %      Basophil % 0.6 %      Immature Grans % 0.3 %      Neutrophils, Absolute 6.34 10*3/mm3      Lymphocytes, Absolute 1.11 10*3/mm3      Monocytes, Absolute 0.63 10*3/mm3      Eosinophils, Absolute 0.55 10*3/mm3      Basophils, Absolute 0.05 10*3/mm3      Immature Grans, Absolute 0.03 10*3/mm3      nRBC 0.3 (H) /100 WBC     Scan Slide [[id number]] Collected:  10/09/17 1102    Specimen:  Blood Updated:  10/09/17 1146     Anisocytosis Mod/2+     Hypochromia Mod/2+     Microcytes Large/3+     Target Cells Mod/2+     WBC Morphology Normal     Platelet Morphology Normal    Comprehensive Metabolic Panel [[id number]]  (Abnormal) Collected:  10/09/17 1102    Specimen:  Blood Updated:  10/09/17 1151     Glucose 105 (H) mg/dL      BUN 16 mg/dL      Creatinine 1.39 (H) mg/dL      Sodium 140 mmol/L      Potassium 4.6 mmol/L      Chloride 100 mmol/L      CO2 26.6 mmol/L      Calcium 10.5 mg/dL      Total Protein 7.8 g/dL      Albumin 4.20 g/dL      ALT (SGPT) 7 U/L      AST (SGOT) 9 U/L      Alkaline Phosphatase 96 U/L      Total Bilirubin 0.5 mg/dL      eGFR   Amer 48 (L) mL/min/1.73      Globulin 3.6 gm/dL      A/G Ratio 1.2 g/dL      BUN/Creatinine Ratio 11.5     Anion Gap 13.4  mmol/L     Lipase [[id number]]  (Abnormal) Collected:  10/09/17 1102    Specimen:  Blood Updated:  10/09/17 1151     Lipase 12 (L) U/L     Urinalysis, Microscopic Only - Urine, Clean Catch [[id number]]  (Abnormal) Collected:  10/09/17 1102    Specimen:  Urine from Urine, Clean Catch Updated:  10/09/17 1156     RBC, UA 3-5 (A) /HPF      WBC, UA 0-2 /HPF      Bacteria, UA 1+ (A) /HPF      Squamous Epithelial Cells, UA 0-2 /HPF      Hyaline Casts, UA 0-2 /LPF      Calcium Oxalate Crystals, UA Moderate/2+ /HPF      Methodology Manual Light Microscopy    Protime-INR [[id number]]  (Abnormal) Collected:  10/09/17 1240    Specimen:  Blood Updated:  10/09/17 1254     Protime 14.0 Seconds      INR 1.12 (H)    Comprehensive Metabolic Panel [[id number]] Collected:  10/10/17 0521    Specimen:  Blood Updated:  10/10/17 0554    CBC Auto Differential [[id number]] Collected:  10/10/17 0522    Specimen:  Blood Updated:  10/10/17 0554    Scan Slide [[id number]] Collected:  10/10/17 0522    Specimen:  Blood Updated:  10/10/17 0605           Imaging:  Imaging Results (last 24 hours)     Procedure Component Value Units Date/Time    CT Abdomen Pelvis Without Contrast [[id number]] Collected:  10/09/17 1253     Updated:  10/09/17 1253    Narrative:       CT ABDOMEN AND PELVIS WITHOUT CONTRAST     HISTORY: Abdominal pain, status post nephrectomy one month ago.     TECHNIQUE: Axial CT images of the abdomen and pelvis were obtained  without administration of intravenous contrast. The patient was not  given oral contrast. Coronal and sagittal reformats were obtained.     COMPARISON: None.     FINDINGS: There are bilateral trace layering pleural effusions with  areas of atelectasis. There is moderate intra-abdominal fluid present.  The liver demonstrates normal attenuation. Low-density subcapsular  lesion in the right hepatic lobe is seen. The spleen is mildly enlarged  measuring 12 x 9 cm in craniocaudal dimension. The gallbladder, pancreas  is  unremarkable on CT. The right adrenal gland is normal. The right  kidney is unremarkable. There has been a recent left nephrectomy. The  left adrenal is not separately identified. There is soft tissue in this  region likely representing postoperative change. The urinary bladder is  minimally distended limiting evaluation. Colonic diverticulosis is  present. No evidence of bowel obstruction. The midleft colon appears  adherent to the postoperative bed with circumferential wall thickening.  The uterus is not identified and is likely surgically absent.       Impression:       1. Status post left nephrectomy. The midleft colon demonstrates wall  thickening within the operative bed. This finding is likely  postprocedural. There is moderate intra-abdominal fluid that  demonstrates relatively low attenuation values ranging from 15 to 25  Hounsfield units.   2. Bilateral small layering effusions with bibasilar atelectasis within  the lower lobes.  3. Small hypodense liver lesion. The imaging findings are nonspecific  and correlation with remote imaging would be most helpful.     These findings were discussed with Dr. Green by telephone at 12:28 PM  on 10/09/2017.     Radiation dose reduction techniques were utilized, including automated  exposure control and exposure modulation based on body size.                   Assessment:     Principal Problem:    Generalized abdominal pain  Active Problems:    Status post nephrectomy    GILBERTO (acute kidney injury)    History of renal cell carcinoma    Ileus  Free fluid in abdomen of uncertain significance    Plan:     - Unclear whether free fluid represents postsurgical fluid, ileus, or leakage following surgery. The most common source of leakage following left nephrectomy would be pancreatic or lymphatic.   - Would recommend treating ileus for now, and if symptoms fail to resolve, draining the peritoneal fluid for additional testing    Sarthak Thorpe Jr., MD  10/10/17  7:03  AM

## 2017-10-11 ENCOUNTER — APPOINTMENT (OUTPATIENT)
Dept: GENERAL RADIOLOGY | Facility: HOSPITAL | Age: 57
End: 2017-10-11
Attending: INTERNAL MEDICINE

## 2017-10-11 PROBLEM — K59.03 DRUG-INDUCED CONSTIPATION: Status: ACTIVE | Noted: 2017-10-11

## 2017-10-11 PROBLEM — K56.7 ILEUS (HCC): Status: RESOLVED | Noted: 2017-10-10 | Resolved: 2017-10-11

## 2017-10-11 PROBLEM — N17.9 AKI (ACUTE KIDNEY INJURY) (HCC): Status: RESOLVED | Noted: 2017-10-09 | Resolved: 2017-10-11

## 2017-10-11 LAB
ANION GAP SERPL CALCULATED.3IONS-SCNC: 9.9 MMOL/L
BUN BLD-MCNC: 9 MG/DL (ref 6–20)
BUN/CREAT SERPL: 9.6 (ref 7–25)
CALCIUM SPEC-SCNC: 9.2 MG/DL (ref 8.6–10.5)
CHLORIDE SERPL-SCNC: 106 MMOL/L (ref 98–107)
CO2 SERPL-SCNC: 23.1 MMOL/L (ref 22–29)
CREAT BLD-MCNC: 0.94 MG/DL (ref 0.57–1)
DEPRECATED RDW RBC AUTO: 47.1 FL (ref 37–54)
ERYTHROCYTE [DISTWIDTH] IN BLOOD BY AUTOMATED COUNT: 20.2 % (ref 11.7–13)
GFR SERPL CREATININE-BSD FRML MDRD: 75 ML/MIN/1.73
GLUCOSE BLD-MCNC: 87 MG/DL (ref 65–99)
HCT VFR BLD AUTO: 26 % (ref 35.6–45.5)
HGB BLD-MCNC: 8.9 G/DL (ref 11.9–15.5)
MCH RBC QN AUTO: 21.8 PG (ref 26.9–32)
MCHC RBC AUTO-ENTMCNC: 34.2 G/DL (ref 32.4–36.3)
MCV RBC AUTO: 63.6 FL (ref 80.5–98.2)
PLATELET # BLD AUTO: 307 10*3/MM3 (ref 140–500)
PMV BLD AUTO: 9.4 FL (ref 6–12)
POTASSIUM BLD-SCNC: 4.5 MMOL/L (ref 3.5–5.2)
RBC # BLD AUTO: 4.09 10*6/MM3 (ref 3.9–5.2)
SODIUM BLD-SCNC: 139 MMOL/L (ref 136–145)
WBC NRBC COR # BLD: 6.02 10*3/MM3 (ref 4.5–10.7)

## 2017-10-11 PROCEDURE — 25010000002 HEPARIN (PORCINE) PER 1000 UNITS: Performed by: INTERNAL MEDICINE

## 2017-10-11 PROCEDURE — 80048 BASIC METABOLIC PNL TOTAL CA: CPT | Performed by: INTERNAL MEDICINE

## 2017-10-11 PROCEDURE — 74000 HC ABDOMEN KUB: CPT

## 2017-10-11 PROCEDURE — 85027 COMPLETE CBC AUTOMATED: CPT | Performed by: INTERNAL MEDICINE

## 2017-10-11 RX ORDER — LACTULOSE 10 G/15ML
20 SOLUTION ORAL DAILY
Status: DISCONTINUED | OUTPATIENT
Start: 2017-10-11 | End: 2017-10-12

## 2017-10-11 RX ADMIN — HYDROCODONE BITARTRATE AND ACETAMINOPHEN 1 TABLET: 7.5; 325 TABLET ORAL at 09:24

## 2017-10-11 RX ADMIN — BISACODYL 10 MG: 10 SUPPOSITORY RECTAL at 09:25

## 2017-10-11 RX ADMIN — HEPARIN SODIUM 5000 UNITS: 5000 INJECTION, SOLUTION INTRAVENOUS; SUBCUTANEOUS at 23:34

## 2017-10-11 RX ADMIN — SODIUM CHLORIDE 100 ML/HR: 9 INJECTION, SOLUTION INTRAVENOUS at 09:24

## 2017-10-11 RX ADMIN — BISACODYL 10 MG: 10 SUPPOSITORY RECTAL at 21:09

## 2017-10-11 RX ADMIN — HYDROCODONE BITARTRATE AND ACETAMINOPHEN 1 TABLET: 7.5; 325 TABLET ORAL at 18:39

## 2017-10-11 RX ADMIN — SENNOSIDES AND DOCUSATE SODIUM 2 TABLET: 8.6; 5 TABLET ORAL at 18:36

## 2017-10-11 RX ADMIN — HEPARIN SODIUM 5000 UNITS: 5000 INJECTION, SOLUTION INTRAVENOUS; SUBCUTANEOUS at 16:09

## 2017-10-11 RX ADMIN — SENNOSIDES AND DOCUSATE SODIUM 2 TABLET: 8.6; 5 TABLET ORAL at 11:15

## 2017-10-11 RX ADMIN — HYDROCODONE BITARTRATE AND ACETAMINOPHEN 1 TABLET: 7.5; 325 TABLET ORAL at 23:33

## 2017-10-11 RX ADMIN — LACTULOSE 20 G: 20 SOLUTION ORAL at 15:06

## 2017-10-11 NOTE — PLAN OF CARE
Problem: Patient Care Overview (Adult)  Goal: Plan of Care Review  Outcome: Ongoing (interventions implemented as appropriate)    10/11/17 0339   Coping/Psychosocial Response Interventions   Plan Of Care Reviewed With patient;spouse   Patient Care Overview   Progress improving   Outcome Evaluation   Outcome Summary/Follow up Plan pt medicated x 1 before bed for c/o abdominal pain, pt denies nausea, bowel sounds positive, pt passed small amount of brown/pink tinged mucous, KUB scheduled this am, VSS       Goal: Adult Individualization and Mutuality  Outcome: Ongoing (interventions implemented as appropriate)  Goal: Discharge Needs Assessment  Outcome: Ongoing (interventions implemented as appropriate)    Problem: Pain, Acute (Adult)  Goal: Acceptable Pain Control/Comfort Level  Outcome: Ongoing (interventions implemented as appropriate)

## 2017-10-11 NOTE — PROGRESS NOTES
"     LOS: 1 day   Primary Care Physician: Petros Roach MD     Subjective   A little better.  No bowel movement yet but hungry and wants to try eating solid food.  Has been up and about.  No dizziness or lightheadedness.    Vital Signs  Body mass index is 30.47 kg/(m^2).  Temp:  [98 °F (36.7 °C)-99.4 °F (37.4 °C)] 99.2 °F (37.3 °C)  Heart Rate:  [81-90] 84  Resp:  [18] 18  BP: ()/(54-64) 113/60      Objective:  General Appearance:  In no acute distress.    Vital signs: (most recent): Blood pressure 113/60, pulse 84, temperature 99.2 °F (37.3 °C), temperature source Oral, resp. rate 18, height 60\" (152.4 cm), weight 156 lb (70.8 kg), SpO2 95 %.    Lungs:  There are decreased breath sounds.  No wheezes, rales or rhonchi.    Heart: Normal rate.  Regular rhythm.  No murmur.   Abdomen: Abdomen is soft and distended.  Bowel sounds are normal.   There is generalized tenderness.   There is no splenomegaly. There is no hepatomegaly.   Extremities: There is no dependent edema.    Neurological: Patient is alert.          Results Review:    I reviewed the patient's new clinical results.      Results from last 7 days  Lab Units 10/11/17  0700 10/10/17  1515 10/10/17  0522   WBC 10*3/mm3 6.02  --  5.77   HEMOGLOBIN g/dL 8.9* 8.9* 8.7*   PLATELETS 10*3/mm3 307  --  274       Results from last 7 days  Lab Units 10/11/17  0700 10/10/17  0521   SODIUM mmol/L 139 139   POTASSIUM mmol/L 4.5 4.4   CHLORIDE mmol/L 106 104   CO2 mmol/L 23.1 24.9   BUN mg/dL 9 15   CREATININE mg/dL 0.94 1.19*   CALCIUM mg/dL 9.2 9.2   GLUCOSE mg/dL 87 85       Results from last 7 days  Lab Units 10/09/17  1240   INR  1.12*     Hemoglobin A1C:No results found for: HGBA1C    Glucose Range:No results found for: POCGLU    No results found for: KCTTYWAX23    No results found for: TSH    Assessment & Plan      Medication Review: Yes    Active Hospital Problems (** Indicates Principal Problem)    Diagnosis Date Noted   • **Generalized abdominal pain " [R10.84] 10/09/2017   • Drug-induced constipation [K59.03] 10/11/2017   • Dehydration [E86.0] 10/10/2017   • Status post nephrectomy [Z90.5] 10/09/2017   • History of renal cell carcinoma [Z85.528] 10/09/2017      Resolved Hospital Problems    Diagnosis Date Noted Date Resolved   • Ileus [K56.7] 10/10/2017 10/11/2017   • GILBERTO (acute kidney injury) [N17.9] 10/09/2017 10/11/2017       Assessment/Plan  1. Constipation- try MiraLAX by mouth.  If no results soap suds enema.  Advance diet.  Decrease IV fluids.  No ileus on KUB  2.  Acute blood loss anemia, stable.  She dropped after hydration.  Hemoglobin August 2017 was 10.  Usual anemia workup ordered.  Probably from recent surgery and chronic disease.  3.  Acute kidney injury, resolved.  Creatinine dropped from 1.39 to 0.94    Peg Arreguin MD  10/11/17  12:57 PM

## 2017-10-11 NOTE — PLAN OF CARE
Problem: Patient Care Overview (Adult)  Goal: Plan of Care Review  Outcome: Ongoing (interventions implemented as appropriate)    10/11/17 1532   Coping/Psychosocial Response Interventions   Plan Of Care Reviewed With patient   Patient Care Overview   Progress progress toward functional goals is gradual   Outcome Evaluation   Outcome Summary/Follow up Plan small bm after ducolax supp. still hypoactive bowel sounds and distended abd. amb in halls . aikl reg diet without nausea         10/11/17 1532   Coping/Psychosocial Response Interventions   Plan Of Care Reviewed With patient   Patient Care Overview   Progress progress toward functional goals is gradual   Outcome Evaluation   Outcome Summary/Follow up Plan small bm after ducolax supp. still hypoactive bowel sounds and distended abd. amb in halls . akil reg diet without nausea       Goal: Adult Individualization and Mutuality  Outcome: Ongoing (interventions implemented as appropriate)  Goal: Discharge Needs Assessment  Outcome: Ongoing (interventions implemented as appropriate)    Problem: Pain, Acute (Adult)  Goal: Acceptable Pain Control/Comfort Level  Outcome: Ongoing (interventions implemented as appropriate)

## 2017-10-11 NOTE — PAYOR COMM NOTE
"Dang Thomas (57 y.o. Female)     Date of Birth Social Security Number Address Home Phone MRN    1960  [place] [address] [phone] [id number]    [place] Marital Status          Buddhist        Admission Date Admission Type Admitting Provider Attending Provider Department, Room/Bed    10/9/17 Emergency Vilma Ann MD Hayden, Juliana, MD [address], 91/[address]    Discharge Date Discharge Disposition Discharge Destination                      Attending Provider: Peg Arreguin MD     Allergies:  Oxycodone    Isolation:  None   Infection:  None   Code Status:  FULL    Ht:  60\" (152.4 cm)   Wt:  156 lb (70.8 kg)    Admission Cmt:  None   Principal Problem:  Generalized abdominal pain [R10.84]                 Active Insurance as of 10/9/2017     Primary Coverage     Payor Plan Insurance Group Employer/Plan Group    [employer] BLUE [employer]OSS [employer] BLUE CROSS BLUE [employer]IELD PPO [id number]     Payor Plan Address Payor Plan Phone Number Effective From Effective To    [address] [phone] 2014     [place] [address]       Subscriber Name Subscriber Birth Date Member ID       DANG THOMAS 1960 [id number]                 Emergency Contacts      (Rel.) Home Phone Work Phone Mobile Phone    Antonio Thomas (Spouse) [phone] -- --        ATTN: NURSE REVIEW   REF#[id number]  PLEASE CALL BACK TO LAURA [phone] OR [phone]  THANKS!  LAURA       History & Physical      Vilma Ann MD at 10/9/2017  7:34 PM          Internal medicine history and physical   INTERNAL MEDICINE   [facility]       Patient Identification:  Name: Dang Thomas  Age: 57 y.o.  Sex: female  :  1960  MRN: [id number]                 Primary Care Physician: Petros Roach MD                                   Chief Complaint:  Abdominal discomfort since     History of Present Illness:   Patient is a " 57-year-old female with past medical history as noted below was in her usual state of her health until 26th of September after having her left nephrectomy done in August of this year.  Her surgery was performed on 28th of August for renal cell carcinoma.  The patient she had a follow-up visit with Dr. Thorpe week after surgery and everything was fine she did okay since then until 26th of September when she started noticing that her left side of her abdomen back was hurting.  Around that time she was also started on vitamin D pills which was giving her diarrhea.  Her abdominal discomfort continued to get worse to the point that she decided to come to the emergency room for further evaluation and was found to have free fluid in the peritoneal cavity of unclear etiology.  Patient is being admitted for further evaluation by urology service and we were asked to admit the patient to expedite this.  Patient says that her pain gets worse when she moves and turn.  Patient denies any fever or denies any chills denies any nausea vomiting or diarrhea except when she took the vitamin D pills.  She denies any rash.  She denies any exposure to sick contact.  She denies taking any new antibiotics recently.  Apparently her nephrectomy was total/radical nephrectomy performed via cup ascorbic approach.      Past Medical History:  Past Medical History:   Diagnosis Date   • Kidney tumor      Past Surgical History:  Past Surgical History:   Procedure Laterality Date   • BREAST LUMPECTOMY     • HYSTERECTOMY     • KNEE ARTHROSCOPY     • NEPHRECTOMY Left 8/28/2017    Procedure: HAND ASSISTED LEFT LAPAROSCOPIC NEPHRECTOMY ;  Surgeon: Sarthak Thorpe Jr., MD;  Location: [facility];  Service:       Home Meds:  No prescriptions prior to admission.     Current Meds:     Current Facility-Administered Medications:   •  HYDROcodone-acetaminophen (NORCO) 7.5-325 MG per tablet 1 tablet, 1 tablet, Oral, Q4H PRN, Vilma Ann MD, 1 tablet at  "10/09/17 1629  •  morphine injection 2 mg, 2 mg, Intravenous, Q4H PRN, Vilma Ann MD  •  ondansetron (ZOFRAN) injection 4 mg, 4 mg, Intravenous, Q6H PRN, Vilma Ann MD, 4 mg at 10/09/17 1628  •  sodium chloride 0.9 % flush 10 mL, 10 mL, Intravenous, PRN, Anurag Green MD  •  sodium chloride 0.9 % infusion, 100 mL/hr, Intravenous, Continuous, Vilma Ann MD, Last Rate: 100 mL/hr at 10/09/17 1629, 100 mL/hr at 10/09/17 1629  Allergies:  Allergies   Allergen Reactions   • Oxycodone Swelling     Social History:   Social History   Substance Use Topics   • Smoking status: Former Smoker     Packs/day: 0.50     Years: 30.00     Types: Cigarettes     Quit date: 8/25/2017   • Smokeless tobacco: Never Used      Comment: Last cigarette 8/25/17   • Alcohol use No      Family History:  Family History   Problem Relation Age of Onset   • Malig Hyperthermia Neg Hx           Review of Systems  See history of present illness and past medical history.  Constitutional: Positive for appetite change (decreased) and fatigue. Negative for fever.        \"feeling ill\"   HENT: Negative for sore throat.    Eyes: Negative.    Respiratory: Negative for cough and shortness of breath.    Cardiovascular: Negative for chest pain.   Gastrointestinal: Positive for abdominal pain. Negative for diarrhea and vomiting.   Genitourinary: Positive for dysuria and flank pain.   Musculoskeletal: Negative for neck pain.   Skin: Negative for rash.   Allergic/Immunologic: Negative.    Neurological: Negative for weakness, numbness and headaches.   Hematological: Negative.    Psychiatric/Behavioral: Negative    Vitals:   /76 (BP Location: Left arm, Patient Position: Lying)  Pulse 78  Temp 98.3 °F (36.8 °C) (Oral)   Resp 18  Ht 60\" (152.4 cm)  Wt 156 lb (70.8 kg)  SpO2 98%  BMI 30.47 kg/m2  I/O:   Intake/Output Summary (Last 24 hours) at 10/09/17 1934  Last data filed at 10/09/17 1818   Gross per 24 hour   Intake              210 ml   Output       "          0 ml   Net              210 ml     Exam:  General Appearance:    Alert, cooperative, no distress, appears stated age, Does not appear to be toxic or septic.     Head:    Normocephalic, without obvious abnormality, atraumatic   Eyes:    PERRL, conjunctiva/corneas clear, EOM's intact, both eyes   Ears:    Normal external ear canals, both ears   Nose:   Nares normal, septum midline, mucosa normal, no drainage    or sinus tenderness   Throat:   Lips, tongue, gums normal; oral mucosa pink and moist   Neck:   Supple, symmetrical, trachea midline, no adenopathy;     thyroid:  no enlargement/tenderness/nodules; no carotid    bruit or JVD   Back:     Symmetric, no curvature, ROM normal, no CVA tenderness   Lungs:     Clear to auscultation bilaterally, respirations unlabored   Chest Wall:    No tenderness or deformity    Heart:    Regular rate and rhythm, S1 and S2 normal, no murmur, rub   or gallop   Abdomen:     Abdominal examination is remarkable for soft abdomen no rebound tenderness midline incision is healed no obvious rash on the abdomen noted    Extremities:   Extremities normal, atraumatic, no cyanosis or edema   Pulses:   Pulses palpable in all extremities; symmetric all extremities   Skin:   Skin color normal, Skin is warm and dry,  no rashes or palpable lesions   Neurologic:   CNII-XII intact, motor strength grossly intact, sensation grossly intact to light touch, no focal deficits noted       Data Review:      I reviewed the patient's new clinical results.    Results from last 7 days  Lab Units 10/09/17  1102   WBC 10*3/mm3 8.71   HEMOGLOBIN g/dL 11.5*   PLATELETS 10*3/mm3 342       Results from last 7 days  Lab Units 10/09/17  1102   SODIUM mmol/L 140   POTASSIUM mmol/L 4.6   CHLORIDE mmol/L 100   CO2 mmol/L 26.6   BUN mg/dL 16   CREATININE mg/dL 1.39*   CALCIUM mg/dL 10.5   GLUCOSE mg/dL 105*   RADIOLOGY  CT Abdomen Pelvis Without Contrast   Preliminary Result   1. Status post left nephrectomy. The  midleft colon demonstrates wall   thickening within the operative bed. This finding is likely   postprocedural. There is moderate intra-abdominal fluid that   demonstrates relatively low attenuation values ranging from 15 to 25   Hounsfield units.    2. Bilateral small layering effusions with bibasilar atelectasis within   the lower lobes.   3. Small hypodense liver lesion. The imaging findings are nonspecific   and correlation with remote imaging would be most helpful.         Assessment:  Active Hospital Problems (** Indicates Principal Problem)    Diagnosis Date Noted   • **Generalized abdominal pain [R10.84] 10/09/2017   • Status post nephrectomy [Z90.5] 10/09/2017   • GILBERTO (acute kidney injury) [N17.9] 10/09/2017   • History of renal cell carcinoma [Z85.528] 10/09/2017   Mid descending colon wall thickening    Resolved Hospital Problems    Diagnosis Date Noted Date Resolved   No resolved problems to display.       Plan:  Admit the patient, manage her pain, consult urology service Dr. Thorpe to comment on this left-sided abdominal discomfort following nephrectomy in August of this year.  He will leave it up to Dr. Thorpe if she can be allowed to eat and make a decision whether this fluid in her abdomen needs to be tapped.  See orders.    Vilma Ann MD   10/9/2017  7:34 PM  Much of this encounter note is an electronic transcription/translation of spoken language to printed text. The electronic translation of spoken language may permit erroneous, or at times, nonsensical words or phrases to be inadvertently transcribed; Although I have reviewed the note for such errors, some may still exist       Electronically signed by Vilma Ann MD at 10/9/2017 11:11 PM           Emergency Department Notes      Anurag Green MD at 10/9/2017 12:06 PM           EMERGENCY DEPARTMENT ENCOUNTER    CHIEF COMPLAINT  Chief Complaint: abdominal pain  History given by: Patient  History limited by: Nothing  Room Number: 27/27  PMD:  "ePtros Roach MD      HPI:  Pt is a 57 y.o. female who presents complaining of persistent abdominal pain and left flank pain for the past two weeks s/p nephrectomy 8/28 performed by . Pt began experiencing diffuse abdominal \"cramps / bloating\" about three weeks after her surgery which has not improved. She is has also experienced dysuria but denies any fever, chills, nausea, vomiting or diarrhea since onset. The abdominal / flank pain is exacerbated by movement and is alleviated by positional rest. This has continued to worsen and she is now feeling generally ill, fatigued, and has a decreased appetite. No other complaints at this time.    Duration:  Two weeks  Onset: gradual  Timing: constant  Location: abdomen, left flank  Radiation: none  Quality: \"pain\"  Intensity/Severity: moderate  Progression: worsening  Associated Symptoms: ill feeling, fatigue, decreased appetite, dysuria  Aggravating Factors: eating, movement  Alleviating Factors: positional rest  Previous Episodes: none  Treatment before arrival: none    PAST MEDICAL HISTORY  Active Ambulatory Problems     Diagnosis Date Noted   • Left renal mass 08/28/2017     Resolved Ambulatory Problems     Diagnosis Date Noted   • No Resolved Ambulatory Problems     Past Medical History:   Diagnosis Date   • Kidney tumor        PAST SURGICAL HISTORY  Past Surgical History:   Procedure Laterality Date   • BREAST LUMPECTOMY     • HYSTERECTOMY     • KNEE ARTHROSCOPY     • NEPHRECTOMY Left 8/28/2017    Procedure: HAND ASSISTED LEFT LAPAROSCOPIC NEPHRECTOMY ;  Surgeon: Sarthak Thorpe Jr., MD;  Location: [facility];  Service:        FAMILY HISTORY  Family History   Problem Relation Age of Onset   • Malig Hyperthermia Neg Hx        SOCIAL HISTORY  Social History     Social History   • Marital status:      Spouse name: N/A   • Number of children: N/A   • Years of education: N/A     Occupational History   • Not on file.     Social History Main " "Topics   • Smoking status: Current Every Day Smoker     Packs/day: 0.50     Years: 30.00     Types: Cigarettes   • Smokeless tobacco: Not on file      Comment: Last cigarette 8/25/17   • Alcohol use No   • Drug use: No   • Sexual activity: Defer     Other Topics Concern   • Not on file     Social History Narrative       ALLERGIES  Oxycodone    REVIEW OF SYSTEMS  Review of Systems   Constitutional: Positive for appetite change (decreased) and fatigue. Negative for fever.        \"feeling ill\"   HENT: Negative for sore throat.    Eyes: Negative.    Respiratory: Negative for cough and shortness of breath.    Cardiovascular: Negative for chest pain.   Gastrointestinal: Positive for abdominal pain. Negative for diarrhea and vomiting.   Genitourinary: Positive for dysuria and flank pain.   Musculoskeletal: Negative for neck pain.   Skin: Negative for rash.   Allergic/Immunologic: Negative.    Neurological: Negative for weakness, numbness and headaches.   Hematological: Negative.    Psychiatric/Behavioral: Negative.    All other systems reviewed and are negative.      PHYSICAL EXAM  ED Triage Vitals   Temp Heart Rate Resp BP SpO2   10/09/17 0932 10/09/17 0932 10/09/17 0932 10/09/17 1133 10/09/17 0932   99 °F (37.2 °C) 123 16 106/73 97 %      Temp src Heart Rate Source Patient Position BP Location FiO2 (%)   10/09/17 0932 -- -- -- --   Tympanic           Physical Exam   Constitutional: She is oriented to person, place, and time and well-developed, well-nourished, and in no distress. No distress.   Pt appears un-well.    HENT:   Head: Normocephalic and atraumatic.   Eyes: EOM are normal. Pupils are equal, round, and reactive to light.   Neck: Normal range of motion. Neck supple.   Cardiovascular: Normal rate, regular rhythm and normal heart sounds.    Pulmonary/Chest: Effort normal and breath sounds normal. No respiratory distress.   CTAB   Abdominal: Soft. Bowel sounds are hypoactive. There is generalized tenderness. There " is guarding. There is no rebound.   Musculoskeletal: Normal range of motion. She exhibits no edema.   No pedal edema, no calf tenderness.   Neurological: She is alert and oriented to person, place, and time. She has normal sensation and normal strength.   Skin: Skin is warm and dry. No rash noted.   anterior incision looks clean and dry and healing well.   Psychiatric: Mood and affect normal.   Nursing note and vitals reviewed.      LAB RESULTS  Lab Results (last 24 hours)     Procedure Component Value Units Date/Time    CBC & Differential [[id number]] Collected:  10/09/17 1102    Specimen:  Blood Updated:  10/09/17 1146    Narrative:       The following orders were created for panel order CBC & Differential.  Procedure                               Abnormality         Status                     ---------                               -----------         ------                     Scan Slide[[id number]]                                       Final result               CBC Auto Differential[[id number]]        Abnormal            Final result                 Please view results for these tests on the individual orders.    Comprehensive Metabolic Panel [[id number]]  (Abnormal) Collected:  10/09/17 1102    Specimen:  Blood Updated:  10/09/17 1151     Glucose 105 (H) mg/dL      BUN 16 mg/dL      Creatinine 1.39 (H) mg/dL      Sodium 140 mmol/L      Potassium 4.6 mmol/L      Chloride 100 mmol/L      CO2 26.6 mmol/L      Calcium 10.5 mg/dL      Total Protein 7.8 g/dL      Albumin 4.20 g/dL      ALT (SGPT) 7 U/L      AST (SGOT) 9 U/L      Alkaline Phosphatase 96 U/L      Total Bilirubin 0.5 mg/dL      eGFR   Amer 48 (L) mL/min/1.73      Globulin 3.6 gm/dL      A/G Ratio 1.2 g/dL      BUN/Creatinine Ratio 11.5     Anion Gap 13.4 mmol/L     Lipase [[id number]]  (Abnormal) Collected:  10/09/17 1102    Specimen:  Blood Updated:  10/09/17 1151     Lipase 12 (L) U/L     Urinalysis With / Culture If Indicated - Urine, Clean Catch  [[id number]]  (Abnormal) Collected:  10/09/17 1102    Specimen:  Urine from Urine, Clean Catch Updated:  10/09/17 1136     Color, UA Tari (A)     Appearance, UA Cloudy (A)     pH, UA 5.5     Specific Gravity, UA >=1.030     Glucose, UA Negative     Ketones, UA Trace (A)     Bilirubin, UA Negative     Blood, UA Small (1+) (A)     Protein, UA 30 mg/dL (1+) (A)     Leuk Esterase, UA Negative     Nitrite, UA Negative     Urobilinogen, UA 1.0 E.U./dL    CBC Auto Differential [[id number]]  (Abnormal) Collected:  10/09/17 1102    Specimen:  Blood Updated:  10/09/17 1146     WBC 8.71 10*3/mm3      RBC 5.09 10*6/mm3      Hemoglobin 11.5 (L) g/dL      Hematocrit 33.1 (L) %      MCV 65.0 (L) fL      MCH 22.6 (L) pg      MCHC 34.7 g/dL      RDW 20.3 (H) %      RDW-SD 47.2 fl      Platelets 342 10*3/mm3      Neutrophil % 72.9 %      Lymphocyte % 12.7 (L) %      Monocyte % 7.2 %      Eosinophil % 6.3 (H) %      Basophil % 0.6 %      Immature Grans % 0.3 %      Neutrophils, Absolute 6.34 10*3/mm3      Lymphocytes, Absolute 1.11 10*3/mm3      Monocytes, Absolute 0.63 10*3/mm3      Eosinophils, Absolute 0.55 10*3/mm3      Basophils, Absolute 0.05 10*3/mm3      Immature Grans, Absolute 0.03 10*3/mm3      nRBC 0.3 (H) /100 WBC     Scan Slide [[id number]] Collected:  10/09/17 1102    Specimen:  Blood Updated:  10/09/17 1146     Anisocytosis Mod/2+     Hypochromia Mod/2+     Microcytes Large/3+     Target Cells Mod/2+     WBC Morphology Normal     Platelet Morphology Normal    Urinalysis, Microscopic Only - Urine, Clean Catch [[id number]]  (Abnormal) Collected:  10/09/17 1102    Specimen:  Urine from Urine, Clean Catch Updated:  10/09/17 1156     RBC, UA 3-5 (A) /HPF      WBC, UA 0-2 /HPF      Bacteria, UA 1+ (A) /HPF      Squamous Epithelial Cells, UA 0-2 /HPF      Hyaline Casts, UA 0-2 /LPF      Calcium Oxalate Crystals, UA Moderate/2+ /HPF      Methodology Manual Light Microscopy    Protime-INR [[id number]]  (Abnormal) Collected:   10/09/17 1240    Specimen:  Blood Updated:  10/09/17 1254     Protime 14.0 Seconds      INR 1.12 (H)          I ordered the above labs and reviewed the results    RADIOLOGY  CT Abdomen Pelvis Without Contrast   Preliminary Result   1. Status post left nephrectomy. The midleft colon demonstrates wall   thickening within the operative bed. This finding is likely   postprocedural. There is moderate intra-abdominal fluid that   demonstrates relatively low attenuation values ranging from 15 to 25   Hounsfield units.    2. Bilateral small layering effusions with bibasilar atelectasis within   the lower lobes.   3. Small hypodense liver lesion. The imaging findings are nonspecific   and correlation with remote imaging would be most helpful.       These findings were discussed with Dr. Green by telephone at 12:28 PM   on 10/09/2017.       Radiation dose reduction techniques were utilized, including automated   exposure control and exposure modulation based on body size.                 Reviewed CT abd/pel which shows that there is a moderate amount of free fluid in her abdomen. The fluid is thicker than water but thinner than blood. Independently viewed by me. Interpreted by radiologist. Discussed with .      I ordered the above noted radiological studies. Interpreted by radiologist. Discussed with radiologist (Dr. Chandler). Reviewed by me in PACS.       PROCEDURES  Procedures      PROGRESS AND CONSULTS  ED Course     1134  Ordered CT Abd/pelvis for further evaluation.     1205  Ordered IV fluids for hydration.     1229  Placed call to Dr Thorpe (urology) for consult.     1259  Discussed Pt's case with Dr. Thorpe (urology) and informed him of the fluid in her abdomen. I offered to admit patient, but he suggested discharge the patient and he will follow up with her in the office.    1307  Rechecked Pt and Pt is resting comfortably. Discussed with Pt the results of her CT abd/pel which shows moderate amount of  free fluid in the abd. Informed Pt that I talked with Dr. Thorpe who believes that this will resolve with time and he recommends monitoring the situation and get a repeat CT scan if necessary after a follow up in his office. I informed Pt that we are unsure of the identity of the fluid in her abdomen and discussed with Pt the possibility of getting an ultrasound to tap fluid to analyze it. Pt says that she has been experiencing sharp pain since being in the ER and that she has been experiencing trouble sleeping due to discomfort. I informed Pt that I would be willing to call Dr. Thorpe again to discuss possibility of admission to determine what the fluid is and to monitor worsening symptoms.     1322  Discussed patient's case with Dr. Thorpe, including Pt's concerns about going home in such discomfort. Dr Thorpe agrees to admit patient to a med-surg bed.    1333  Consulted with Dr. Thorpe again who defers admission to medicine. Placed call to A for admission.      1418  Discussed with Dr. Ann who agrees to admit Pt to a med-surg bed.         MEDICAL DECISION MAKING  Results were reviewed/discussed with the patient and they were also made aware of online access. Pt also made aware that some labs, such as cultures, will not be resulted during ER visit and follow up with PMD is necessary.     MDM  Number of Diagnoses or Management Options  Free fluid in pelvis and abdomen:   Generalized abdominal pain:      Amount and/or Complexity of Data Reviewed  Clinical lab tests: ordered and reviewed (Creatinine= 1.39, UA bacteria= 1+)  Tests in the radiology section of CPT®:  ordered and reviewed ( CT abd/pel shows fluid in her abdomen. The fluid is thicker than water but thinner than blood)  Discussion of test results with the performing providers: yes (Dr. Chandler (radiology))  Decide to obtain previous medical records or to obtain history from someone other than the patient: yes  Discuss the patient with other providers:  yes (Discussed Pt's case with Dr. Thorpe (urology)           DIAGNOSIS  Final diagnoses:   Generalized abdominal pain   Free fluid in pelvis and abdomen       DISPOSITION  ADMISSION    Discussed treatment plan and reason for admission with pt/family and admitting physician.  Pt/family voiced understanding of the plan for admission for further testing/treatment as needed.         Latest Documented Vital Signs:  As of 2:20 PM  BP- 123/70 HR- 96 Temp- 99 °F (37.2 °C) (Tympanic) O2 sat- 99%    --  Documentation assistance provided by eric Cantu for Dr. Green.  Information recorded by the scribe was done at my direction and has been verified and validated by me.        Tran Cantu  10/09/17 1420       Zeferino Guadarrama  10/09/17 1438       Anurag Green MD  10/09/17 1957       Electronically signed by Anurag Green MD at 10/9/2017  7:57 PM      Kimberly Lomeli RN at 10/9/2017 12:39 PM          Called lab to add on PT/INR to existing blue top collected.  stated INR will be processed.      Kimberly Lomeli RN  10/09/17 1240       Electronically signed by Kimberly Lomeli RN at 10/9/2017 12:40 PM        Hospital Medications (all)       Dose Frequency Start End    bisacodyl (DULCOLAX) suppository 10 mg 10 mg 2 Times Daily 10/10/2017     Sig - Route: Insert 1 suppository into the rectum 2 (Two) Times a Day. - Rectal    heparin (porcine) 5000 UNIT/ML injection 5,000 Units 5,000 Units Every 12 Hours Scheduled 10/9/2017     Sig - Route: Inject 1 mL under the skin Every 12 (Twelve) Hours. - Subcutaneous    HYDROcodone-acetaminophen (NORCO) 7.5-325 MG per tablet 1 tablet 1 tablet Every 4 Hours PRN 10/9/2017 10/19/2017    Sig - Route: Take 1 tablet by mouth Every 4 (Four) Hours As Needed for Moderate Pain . - Oral    morphine injection 2 mg 2 mg Every 4 Hours PRN 10/9/2017 10/19/2017    Sig - Route: Infuse 1 mL into a venous catheter Every 4 (Four) Hours As Needed for Severe Pain . - Intravenous     ondansetron (ZOFRAN) injection 4 mg 4 mg Every 6 Hours PRN 10/9/2017     Sig - Route: Infuse 2 mL into a venous catheter Every 6 (Six) Hours As Needed for Nausea or Vomiting. - Intravenous    sennosides-docusate sodium (SENOKOT-S) 8.6-50 MG tablet 2 tablet 2 tablet 2 Times Daily 10/10/2017     Sig - Route: Take 2 tablets by mouth 2 (Two) Times a Day. - Oral    sodium chloride 0.9 % bolus 500 mL 500 mL Once 10/9/2017 10/9/2017    Sig - Route: Infuse 500 mL into a venous catheter 1 (One) Time. - Intravenous    sodium chloride 0.9 % flush 1-10 mL 1-10 mL As Needed 10/9/2017     Sig - Route: Infuse 1-10 mL into a venous catheter As Needed for Line Care. - Intravenous    sodium chloride 0.9 % flush 10 mL 10 mL As Needed 10/9/2017     Sig - Route: Infuse 10 mL into a venous catheter As Needed for [facility]. - Intravenous    Cosign for Ordering: Accepted by Anurag Green MD on 10/9/2017  1:49 PM    sodium chloride 0.9 % infusion 100 mL/hr Continuous 10/9/2017     Sig - Route: Infuse 100 mL/hr into a venous catheter Continuous. - Intravenous    sennosides-docusate sodium (SENOKOT-S) 8.6-50 MG tablet 2 tablet (Discontinued) 2 tablet Nightly 10/10/2017 10/10/2017    Sig - Route: Take 2 tablets by mouth Every Night. - Oral             Physician Progress Notes (last 72 hours) (Notes from 10/8/2017 10:06 AM through 10/11/2017 10:06 AM)      Peg Arreguin MD at 10/10/2017  2:54 PM  Version 1 of 1              LOS: 0 days   Primary Care Physician: Petros Roach MD     Subjective   Bowels moved very little after a suppository.  Check cramping with that.  She's have nausea when she is tried to eat.  She is going to try soup later.  She feels very bloated.  No PND or orthopnea    Vital Signs  Body mass index is 30.47 kg/(m^2).  Temp:  [97.1 °F (36.2 °C)-98.3 °F (36.8 °C)] 98.1 °F (36.7 °C)  Heart Rate:  [78-92] 81  Resp:  [18] 18  BP: ()/(51-76) 105/58      Objective:  General Appearance:  Uncomfortable and in no  "acute distress.    Vital signs: (most recent): Blood pressure 105/58, pulse 81, temperature 98.1 °F (36.7 °C), temperature source Oral, resp. rate 18, height 60\" (152.4 cm), weight 156 lb (70.8 kg), SpO2 96 %.    HEENT: (No JVD.  Neck supple.  No lymphadenopathy.  Trachea midline)    Lungs:  There are decreased breath sounds.  No wheezes, rales or rhonchi.    Heart: Normal rate.  Regular rhythm.  No murmur.   Abdomen: Abdomen is soft and distended.  (Tympanitic)Bowel sounds are normal.   There is generalized tenderness.  There is no rebound tenderness.   There is no splenomegaly. There is no hepatomegaly.   Extremities: There is no dependent edema.    Neurological: Patient is alert.          Results Review:    I reviewed the patient's new clinical results.      Results from last 7 days  Lab Units 10/10/17  0522 10/09/17  1102   WBC 10*3/mm3 5.77 8.71   HEMOGLOBIN g/dL 8.7* 11.5*   PLATELETS 10*3/mm3 274 342       Results from last 7 days  Lab Units 10/10/17  0521 10/09/17  1102   SODIUM mmol/L 139 140   POTASSIUM mmol/L 4.4 4.6   CHLORIDE mmol/L 104 100   CO2 mmol/L 24.9 26.6   BUN mg/dL 15 16   CREATININE mg/dL 1.19* 1.39*   CALCIUM mg/dL 9.2 10.5   GLUCOSE mg/dL 85 105*       Results from last 7 days  Lab Units 10/09/17  1240   INR  1.12*     Hemoglobin A1C:No results found for: HGBA1C    Glucose Range:No results found for: POCGLU    No results found for: FOEZIZSL94    No results found for: TSH    Assessment & Plan      Medication Review: Yes    Active Hospital Problems (** Indicates Principal Problem)    Diagnosis Date Noted   • **Generalized abdominal pain [R10.84] 10/09/2017   • Ileus [K56.7] 10/10/2017   • Dehydration [E86.0] 10/10/2017   • Status post nephrectomy [Z90.5] 10/09/2017   • GILBERTO (acute kidney injury) [N17.9] 10/09/2017   • History of renal cell carcinoma [Z85.528] 10/09/2017      Resolved Hospital Problems    Diagnosis Date Noted Date Resolved   No resolved problems to display. "       Assessment/Plan  1.  Abdominal pain with findings consistent with ileus.  I ordered a KUB for today and for the morning.  Change diet to liquids.  Results of CT noted.  Some small amount of fluid as well as thickening of part of the colon.  No leukocytosis or fever.  Symptoms started with vitamin D.  Vitamin D can cause constipation with nausea or vomiting.  Will have her stay off vitamin D.  2.  Acute blood loss anemia.  Urine was very concentrated on admission so there is some dilutional component.  Repeat hemoglobin this afternoon to make sure she is not still dropping.  3.  Left nephrectomy September 2017.  Some postoperative changes.  Dr. Thorpe's input noted.  4.  Renal insufficiency.  Creatinine has decreased after IV fluids.  Recheck in a.m.  Dehydration as noted above.  No evidence of volume excess on exam.    Discussed with patient's nurse.  Medical record reviewed.    Peg Arreguin MD  10/10/17  2:56 PM           Electronically signed by Peg Arreguin MD at 10/10/2017  3:04 PM      Sarthak Thorpe Jr., MD at 10/11/2017  9:32 AM  Version 1 of 1         Still with crampy abdominal pain. No BM yesterday despite bid suppositories. Still feels bloated. Poor appetite.    Afebrile  Abd soft, moderately distended  Inc - c/d/i.    Cr 1.94, Hb 8.9, WBC 6.0    Plan:  - dulcolax bid  - ambulation  - minimize narcotics     Electronically signed by Sarthak Thorpe Jr., MD at 10/11/2017  9:33 AM        Medical Student Notes (last 72 hours) (Notes from 10/8/2017 10:06 AM through 10/11/2017 10:06 AM)     No notes of this type exist for this encounter.           Consult Notes (last 72 hours) (Notes from 10/8/2017 10:06 AM through 10/11/2017 10:06 AM)      Sarthak Thorpe Jr., MD at 10/10/2017  7:02 AM  Version 1 of 1     Consult Orders:    1. Urology (on-call MD unless specified) [[id number]] ordered by Anurag Green MD at 10/09/17 1318                       FIRST UROLOGY CONSULT      Patient  Identification:  NAME:  Kimi Thomas  Age:  57 y.o.   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 Postoperatively she did well, and postoperatively she had returned to normal two weeks after surgery. She now presents with crampy abd pain, abdominal bloating, poor appetite and activity level. Minimal nausea, no vomiting.  No fevers or chills. No GH.    CT abd/pelvis reviewed by me: Free fluid within the peritoneal cavity. Left colon thickening, likely postsurgical changes.       Past medical history:  Past Medical History:   Diagnosis Date   • Kidney tumor        Past surgical history:  Past Surgical History:   Procedure Laterality Date   • BREAST LUMPECTOMY     • HYSTERECTOMY     • KNEE ARTHROSCOPY     • NEPHRECTOMY Left 2017    Procedure: HAND ASSISTED LEFT LAPAROSCOPIC NEPHRECTOMY ;  Surgeon: Sarthak Thorpe Jr., MD;  Location: [facility];  Service:        Allergies:  Oxycodone    Home medications:  No prescriptions prior to admission.       Hospital medications:    heparin (porcine) 5,000 Units Subcutaneous Q12H       sodium chloride 100 mL/hr Last Rate: 100 mL/hr (10/10/17 0149)     HYDROcodone-acetaminophen  •  Morphine  •  ondansetron  •  sodium chloride  •  sodium chloride    Family history:  Family History   Problem Relation Age of Onset   • Malig Hyperthermia Neg Hx        Social history:  Social History   Substance Use Topics   • Smoking status: Former Smoker     Packs/day: 0.50     Years: 30.00     Types: Cigarettes     Quit date: 2017   • Smokeless tobacco: Never Used      Comment: Last cigarette 17   • Alcohol use No       Review of systems:    Negative 12-system ROS except for the following:  As above      Objective:  TMax 24 hours:   Temp (24hrs), Av.1 °F (36.7 °C), Min:97.1 °F (36.2 °C), Max:99 °F  (37.2 °C)      Vitals Ranges:   Temp:  [97.1 °F (36.2 °C)-99 °F (37.2 °C)] 97.1 °F (36.2 °C)  Heart Rate:  [] 83  Resp:  [16-18] 18  BP: ()/(57-94) 92/61    Intake/Output Last 3 shifts:  I/O last 3 completed shifts:  In: 1141.7 [P.O.:210; I.V.:931.7]  Out: 700 [Urine:700]     Physical Exam:       General Appearance:    Alert, cooperative, in no acute distress   Head:    Normocephalic, without obvious abnormality, atraumatic   Eyes:          PERRL, conjunctivae and corneas clear   Ears:    Normal external inspection       Neck:   Supple, no LAD, trachea midline   Back:     No CVA tenderness   Lungs:     Respirations unlabored, symmetric excursion       Abdomen:     Soft, moderately to severely distended   :    No pelvic examination performed   Extremities:   No edema, no deformity   Skin:   No bleeding, bruising or rashes   Neuro/Psych:   Orientation intact, mood/affect pleasant, no focal findings       Results review:   I reviewed the patient's new clinical results.    Data review:  Lab Results (last 24 hours)     Procedure Component Value Units Date/Time    Urinalysis With / Culture If Indicated - Urine, Clean Catch [[id number]]  (Abnormal) Collected:  10/09/17 1102    Specimen:  Urine from Urine, Clean Catch Updated:  10/09/17 1136     Color, UA Tari (A)     Appearance, UA Cloudy (A)     pH, UA 5.5     Specific Gravity, UA >=1.030     Glucose, UA Negative     Ketones, UA Trace (A)     Bilirubin, UA Negative     Blood, UA Small (1+) (A)     Protein, UA 30 mg/dL (1+) (A)     Leuk Esterase, UA Negative     Nitrite, UA Negative     Urobilinogen, UA 1.0 E.U./dL    CBC & Differential [[id number]] Collected:  10/09/17 1102    Specimen:  Blood Updated:  10/09/17 1146    Narrative:       The following orders were created for panel order CBC & Differential.  Procedure                               Abnormality         Status                     ---------                               -----------         ------                      Scan Slide[[id number]]                                       Final result               CBC Auto Differential[[id number]]        Abnormal            Final result                 Please view results for these tests on the individual orders.    CBC Auto Differential [[id number]]  (Abnormal) Collected:  10/09/17 1102    Specimen:  Blood Updated:  10/09/17 1146     WBC 8.71 10*3/mm3      RBC 5.09 10*6/mm3      Hemoglobin 11.5 (L) g/dL      Hematocrit 33.1 (L) %      MCV 65.0 (L) fL      MCH 22.6 (L) pg      MCHC 34.7 g/dL      RDW 20.3 (H) %      RDW-SD 47.2 fl      Platelets 342 10*3/mm3      Neutrophil % 72.9 %      Lymphocyte % 12.7 (L) %      Monocyte % 7.2 %      Eosinophil % 6.3 (H) %      Basophil % 0.6 %      Immature Grans % 0.3 %      Neutrophils, Absolute 6.34 10*3/mm3      Lymphocytes, Absolute 1.11 10*3/mm3      Monocytes, Absolute 0.63 10*3/mm3      Eosinophils, Absolute 0.55 10*3/mm3      Basophils, Absolute 0.05 10*3/mm3      Immature Grans, Absolute 0.03 10*3/mm3      nRBC 0.3 (H) /100 WBC     Scan Slide [[id number]] Collected:  10/09/17 1102    Specimen:  Blood Updated:  10/09/17 1146     Anisocytosis Mod/2+     Hypochromia Mod/2+     Microcytes Large/3+     Target Cells Mod/2+     WBC Morphology Normal     Platelet Morphology Normal    Comprehensive Metabolic Panel [[id number]]  (Abnormal) Collected:  10/09/17 1102    Specimen:  Blood Updated:  10/09/17 1151     Glucose 105 (H) mg/dL      BUN 16 mg/dL      Creatinine 1.39 (H) mg/dL      Sodium 140 mmol/L      Potassium 4.6 mmol/L      Chloride 100 mmol/L      CO2 26.6 mmol/L      Calcium 10.5 mg/dL      Total Protein 7.8 g/dL      Albumin 4.20 g/dL      ALT (SGPT) 7 U/L      AST (SGOT) 9 U/L      Alkaline Phosphatase 96 U/L      Total Bilirubin 0.5 mg/dL      eGFR   Amer 48 (L) mL/min/1.73      Globulin 3.6 gm/dL      A/G Ratio 1.2 g/dL      BUN/Creatinine Ratio 11.5     Anion Gap 13.4 mmol/L     Lipase [[id number]]  (Abnormal)  Collected:  10/09/17 1102    Specimen:  Blood Updated:  10/09/17 1151     Lipase 12 (L) U/L     Urinalysis, Microscopic Only - Urine, Clean Catch [[id number]]  (Abnormal) Collected:  10/09/17 1102    Specimen:  Urine from Urine, Clean Catch Updated:  10/09/17 1156     RBC, UA 3-5 (A) /HPF      WBC, UA 0-2 /HPF      Bacteria, UA 1+ (A) /HPF      Squamous Epithelial Cells, UA 0-2 /HPF      Hyaline Casts, UA 0-2 /LPF      Calcium Oxalate Crystals, UA Moderate/2+ /HPF      Methodology Manual Light Microscopy    Protime-INR [[id number]]  (Abnormal) Collected:  10/09/17 1240    Specimen:  Blood Updated:  10/09/17 1254     Protime 14.0 Seconds      INR 1.12 (H)    Comprehensive Metabolic Panel [[id number]] Collected:  10/10/17 0521    Specimen:  Blood Updated:  10/10/17 0554    CBC Auto Differential [[id number]] Collected:  10/10/17 0522    Specimen:  Blood Updated:  10/10/17 0554    Scan Slide [[id number]] Collected:  10/10/17 0522    Specimen:  Blood Updated:  10/10/17 0605           Imaging:  Imaging Results (last 24 hours)     Procedure Component Value Units Date/Time    CT Abdomen Pelvis Without Contrast [[id number]] Collected:  10/09/17 1253     Updated:  10/09/17 1253    Narrative:       CT ABDOMEN AND PELVIS WITHOUT CONTRAST     HISTORY: Abdominal pain, status post nephrectomy one month ago.     TECHNIQUE: Axial CT images of the abdomen and pelvis were obtained  without administration of intravenous contrast. The patient was not  given oral contrast. Coronal and sagittal reformats were obtained.     COMPARISON: None.     FINDINGS: There are bilateral trace layering pleural effusions with  areas of atelectasis. There is moderate intra-abdominal fluid present.  The liver demonstrates normal attenuation. Low-density subcapsular  lesion in the right hepatic lobe is seen. The spleen is mildly enlarged  measuring 12 x 9 cm in craniocaudal dimension. The gallbladder, pancreas  is unremarkable on CT. The right adrenal gland is  normal. The right  kidney is unremarkable. There has been a recent left nephrectomy. The  left adrenal is not separately identified. There is soft tissue in this  region likely representing postoperative change. The urinary bladder is  minimally distended limiting evaluation. Colonic diverticulosis is  present. No evidence of bowel obstruction. The midleft colon appears  adherent to the postoperative bed with circumferential wall thickening.  The uterus is not identified and is likely surgically absent.       Impression:       1. Status post left nephrectomy. The midleft colon demonstrates wall  thickening within the operative bed. This finding is likely  postprocedural. There is moderate intra-abdominal fluid that  demonstrates relatively low attenuation values ranging from 15 to 25  Hounsfield units.   2. Bilateral small layering effusions with bibasilar atelectasis within  the lower lobes.  3. Small hypodense liver lesion. The imaging findings are nonspecific  and correlation with remote imaging would be most helpful.     These findings were discussed with Dr. Green by telephone at 12:28 PM  on 10/09/2017.     Radiation dose reduction techniques were utilized, including automated  exposure control and exposure modulation based on body size.                   Assessment:     Principal Problem:    Generalized abdominal pain  Active Problems:    Status post nephrectomy    GILBERTO (acute kidney injury)    History of renal cell carcinoma    Ileus  Free fluid in abdomen of uncertain significance    Plan:     - Unclear whether free fluid represents postsurgical fluid, ileus, or leakage following surgery. The most common source of leakage following left nephrectomy would be pancreatic or lymphatic.   - Would recommend treating ileus for now, and if symptoms fail to resolve, draining the peritoneal fluid for additional testing    Sarthak Thorpe Jr., MD  10/10/17  7:03 AM             Electronically signed by Sarthak  Angel Luis Thorpe Jr., MD at 10/10/2017  7:11 AM

## 2017-10-11 NOTE — PROGRESS NOTES
Still with crampy abdominal pain. No BM yesterday despite bid suppositories. Still feels bloated. Poor appetite.    Afebrile  Abd soft, moderately distended  Inc - c/d/i.    Cr 1.94, Hb 8.9, WBC 6.0    Plan:  - dulcolax bid  - ambulation  - minimize narcotics

## 2017-10-12 LAB
DEPRECATED RDW RBC AUTO: 47.7 FL (ref 37–54)
ERYTHROCYTE [DISTWIDTH] IN BLOOD BY AUTOMATED COUNT: 20.3 % (ref 11.7–13)
FERRITIN SERPL-MCNC: 469.2 NG/ML (ref 13–150)
FOLATE SERPL-MCNC: 5.73 NG/ML (ref 4.78–24.2)
HCT VFR BLD AUTO: 24.5 % (ref 35.6–45.5)
HGB BLD-MCNC: 8.3 G/DL (ref 11.9–15.5)
IRON 24H UR-MRATE: 21 MCG/DL (ref 37–145)
IRON SATN MFR SERPL: 13 % (ref 20–50)
MCH RBC QN AUTO: 21.7 PG (ref 26.9–32)
MCHC RBC AUTO-ENTMCNC: 33.9 G/DL (ref 32.4–36.3)
MCV RBC AUTO: 64.1 FL (ref 80.5–98.2)
PLATELET # BLD AUTO: 300 10*3/MM3 (ref 140–500)
PMV BLD AUTO: 9.7 FL (ref 6–12)
RBC # BLD AUTO: 3.82 10*6/MM3 (ref 3.9–5.2)
TIBC SERPL-MCNC: 161 MCG/DL
TRANSFERRIN SERPL-MCNC: 108 MG/DL (ref 200–360)
VIT B12 BLD-MCNC: 299 PG/ML (ref 211–946)
WBC NRBC COR # BLD: 5.58 10*3/MM3 (ref 4.5–10.7)

## 2017-10-12 PROCEDURE — 25010000002 HEPARIN (PORCINE) PER 1000 UNITS: Performed by: INTERNAL MEDICINE

## 2017-10-12 PROCEDURE — 84466 ASSAY OF TRANSFERRIN: CPT | Performed by: INTERNAL MEDICINE

## 2017-10-12 PROCEDURE — 83540 ASSAY OF IRON: CPT | Performed by: INTERNAL MEDICINE

## 2017-10-12 PROCEDURE — 82746 ASSAY OF FOLIC ACID SERUM: CPT | Performed by: INTERNAL MEDICINE

## 2017-10-12 PROCEDURE — 82607 VITAMIN B-12: CPT | Performed by: INTERNAL MEDICINE

## 2017-10-12 PROCEDURE — 82728 ASSAY OF FERRITIN: CPT | Performed by: INTERNAL MEDICINE

## 2017-10-12 PROCEDURE — 99254 IP/OBS CNSLTJ NEW/EST MOD 60: CPT | Performed by: INTERNAL MEDICINE

## 2017-10-12 PROCEDURE — 85027 COMPLETE CBC AUTOMATED: CPT | Performed by: INTERNAL MEDICINE

## 2017-10-12 RX ORDER — LACTULOSE 10 G/15ML
20 SOLUTION ORAL DAILY PRN
Status: DISCONTINUED | OUTPATIENT
Start: 2017-10-12 | End: 2017-10-18

## 2017-10-12 RX ORDER — SENNA AND DOCUSATE SODIUM 50; 8.6 MG/1; MG/1
2 TABLET, FILM COATED ORAL 2 TIMES DAILY PRN
Status: DISCONTINUED | OUTPATIENT
Start: 2017-10-12 | End: 2017-10-19 | Stop reason: HOSPADM

## 2017-10-12 RX ADMIN — HYDROCODONE BITARTRATE AND ACETAMINOPHEN 1 TABLET: 7.5; 325 TABLET ORAL at 21:23

## 2017-10-12 RX ADMIN — SODIUM CHLORIDE 50 ML/HR: 9 INJECTION, SOLUTION INTRAVENOUS at 06:40

## 2017-10-12 RX ADMIN — HYDROCODONE BITARTRATE AND ACETAMINOPHEN 1 TABLET: 7.5; 325 TABLET ORAL at 06:40

## 2017-10-12 RX ADMIN — HEPARIN SODIUM 5000 UNITS: 5000 INJECTION, SOLUTION INTRAVENOUS; SUBCUTANEOUS at 09:04

## 2017-10-12 RX ADMIN — HYDROCODONE BITARTRATE AND ACETAMINOPHEN 1 TABLET: 7.5; 325 TABLET ORAL at 11:33

## 2017-10-12 RX ADMIN — SENNOSIDES AND DOCUSATE SODIUM 2 TABLET: 8.6; 5 TABLET ORAL at 11:33

## 2017-10-12 RX ADMIN — HEPARIN SODIUM 5000 UNITS: 5000 INJECTION, SOLUTION INTRAVENOUS; SUBCUTANEOUS at 21:19

## 2017-10-12 RX ADMIN — HYDROCODONE BITARTRATE AND ACETAMINOPHEN 1 TABLET: 7.5; 325 TABLET ORAL at 17:23

## 2017-10-12 RX ADMIN — LACTULOSE 20 G: 20 SOLUTION ORAL at 09:00

## 2017-10-12 RX ADMIN — BISACODYL 10 MG: 10 SUPPOSITORY RECTAL at 09:05

## 2017-10-12 NOTE — PROGRESS NOTES
"Cc abd distention  Doing better   Still co abd distention but some bms and flatus   Ambulating well   No nvfc     LOS: 2 days   Patient Care Team:  Petros Roach MD as PCP - General (Internal Medicine)        Subjective     History of Present Illness    Subjective      Objective     Vital Signs  Temp:  [97.4 °F (36.3 °C)-99.3 °F (37.4 °C)] 97.4 °F (36.3 °C)  Heart Rate:  [80-91] 80  Resp:  [16-18] 16  BP: (104-120)/(60-80) 104/65    Objective:  General Appearance:  Comfortable.    Vital signs: (most recent): Blood pressure 104/65, pulse 80, temperature 97.4 °F (36.3 °C), temperature source Oral, resp. rate 16, height 60\" (152.4 cm), weight 156 lb (70.8 kg), SpO2 92 %.  Vital signs are normal.    Output: Producing urine and producing stool.    HEENT: Normal HEENT exam.    Lungs:  Normal respiratory rate and normal effort.    Abdomen: Abdomen is distended.  (Mod distention)            Results Review:  New clinical results reviewed.        Assessment/Plan     Principal Problem:    Generalized abdominal pain  Active Problems:    Status post nephrectomy    History of renal cell carcinoma    Dehydration    Drug-induced constipation      Assessment:  (Doing better.  I dont have an obvious urologic explanation for her current symptoms and findings.  It seems late in her surgical recovery to have current degree of anemia and intraabdominal fluid.      Will follow).       Orlando Hernandez MD  10/12/17  7:04 AM            "

## 2017-10-12 NOTE — PROGRESS NOTES
"     LOS: 3 days   Primary Care Physician: Petros Roach MD     Subjective   Still having abdominal pain.  Stools have been loose today.  Had a colonoscopy 5 years ago for screening and it was normal.  This was at [facility] [place]    Vital Signs  Body mass index is 30.47 kg/(m^2).  Temp:  [97.4 °F (36.3 °C)-99.3 °F (37.4 °C)] 98.2 °F (36.8 °C)  Heart Rate:  [80-91] 84  Resp:  [16-18] 18  BP: (102-120)/(59-80) 102/59      Objective:  General Appearance:  Uncomfortable.    Vital signs: (most recent): Blood pressure 102/59, pulse 84, temperature 98.2 °F (36.8 °C), temperature source Oral, resp. rate 18, height 60\" (152.4 cm), weight 156 lb (70.8 kg), SpO2 95 %.    Lungs:  There are decreased breath sounds.  No wheezes, rales or rhonchi.    Heart: Normal rate.  Regular rhythm.  No murmur.   Abdomen: Abdomen is soft and distended.  Bowel sounds are normal.   There is generalized tenderness.   There is no splenomegaly. There is no hepatomegaly.   Extremities: There is no dependent edema.    Neurological: Patient is alert.          Results Review:    I reviewed the patient's new clinical results.      Results from last 7 days  Lab Units 10/12/17  0559 10/11/17  0700   WBC 10*3/mm3 5.58 6.02   HEMOGLOBIN g/dL 8.3* 8.9*   PLATELETS 10*3/mm3 300 307       Results from last 7 days  Lab Units 10/11/17  0700 10/10/17  0521   SODIUM mmol/L 139 139   POTASSIUM mmol/L 4.5 4.4   CHLORIDE mmol/L 106 104   CO2 mmol/L 23.1 24.9   BUN mg/dL 9 15   CREATININE mg/dL 0.94 1.19*   CALCIUM mg/dL 9.2 9.2   GLUCOSE mg/dL 87 85       Results from last 7 days  Lab Units 10/09/17  1240   INR  1.12*     Hemoglobin A1C:No results found for: HGBA1C    Glucose Range:No results found for: POCGLU    Lab Results   Component Value Date    PUXWYBYX94 299 10/12/2017       No results found for: TSH    Assessment & Plan      Medication Review: Yes    Active Hospital Problems (** Indicates Principal Problem)    Diagnosis Date Noted   • **Generalized " abdominal pain [R10.84] 10/09/2017   • Drug-induced constipation [K59.03] 10/11/2017   • Dehydration [E86.0] 10/10/2017   • Status post nephrectomy [Z90.5] 10/09/2017   • History of renal cell carcinoma [Z85.528] 10/09/2017      Resolved Hospital Problems    Diagnosis Date Noted Date Resolved   • Ileus [K56.7] 10/10/2017 10/11/2017   • GILBERTO (acute kidney injury) [N17.9] 10/09/2017 10/11/2017       Assessment/Plan  1.  Persistent abdominal pain.  Constipation appears improved.  Initial CAT scan showed some colon inflammation.  Last GI to see regarding further management.  Discussed this morning with Dr. Hernandez.  He thinks that her symptoms are fairly late to be related to her nephrectomy.  2.  Anemia with some acute blood loss and low iron stores.  B12 low normal.  Methylmalonic acid level ordered.  Recheck in a.m.  3.  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

## 2017-10-12 NOTE — PLAN OF CARE
Problem: Patient Care Overview (Adult)  Goal: Plan of Care Review  Outcome: Ongoing (interventions implemented as appropriate)    10/12/17 8976   Coping/Psychosocial Response Interventions   Plan Of Care Reviewed With patient   Patient Care Overview   Progress progress towards functional goals is fair   Outcome Evaluation   Outcome Summary/Follow up Plan several loose stools this shift. reports pain not improved . pain med x 2 this shift       Goal: Adult Individualization and Mutuality  Outcome: Ongoing (interventions implemented as appropriate)  Goal: Discharge Needs Assessment  Outcome: Ongoing (interventions implemented as appropriate)    Problem: Pain, Acute (Adult)  Goal: Acceptable Pain Control/Comfort Level  Outcome: Ongoing (interventions implemented as appropriate)

## 2017-10-12 NOTE — PLAN OF CARE
Problem: Patient Care Overview (Adult)  Goal: Plan of Care Review  Outcome: Ongoing (interventions implemented as appropriate)    10/12/17 0254   Coping/Psychosocial Response Interventions   Plan Of Care Reviewed With patient   Patient Care Overview   Progress improving   Outcome Evaluation   Outcome Summary/Follow up Plan pt has had several bm's this shift, denies nausea and tolerating diet, no s/s of bleeding noted, pt continues to c/o abdominal cramping- norco controlling pain well, VSS, pt slept for short intervals throughout the night, spouse at bedside       Goal: Adult Individualization and Mutuality  Outcome: Ongoing (interventions implemented as appropriate)  Goal: Discharge Needs Assessment  Outcome: Ongoing (interventions implemented as appropriate)    Problem: Pain, Acute (Adult)  Goal: Acceptable Pain Control/Comfort Level  Outcome: Ongoing (interventions implemented as appropriate)

## 2017-10-13 ENCOUNTER — APPOINTMENT (OUTPATIENT)
Dept: GENERAL RADIOLOGY | Facility: HOSPITAL | Age: 57
End: 2017-10-13
Attending: UROLOGY

## 2017-10-13 PROBLEM — R93.5 ABNORMAL CT OF THE ABDOMEN: Status: ACTIVE | Noted: 2017-10-09

## 2017-10-13 LAB
ANION GAP SERPL CALCULATED.3IONS-SCNC: 11.8 MMOL/L
BUN BLD-MCNC: 7 MG/DL (ref 6–20)
BUN/CREAT SERPL: 6.7 (ref 7–25)
CALCIUM SPEC-SCNC: 9.2 MG/DL (ref 8.6–10.5)
CHLORIDE SERPL-SCNC: 103 MMOL/L (ref 98–107)
CO2 SERPL-SCNC: 24.2 MMOL/L (ref 22–29)
CREAT BLD-MCNC: 1.05 MG/DL (ref 0.57–1)
DEPRECATED RDW RBC AUTO: 47.1 FL (ref 37–54)
ERYTHROCYTE [DISTWIDTH] IN BLOOD BY AUTOMATED COUNT: 20.3 % (ref 11.7–13)
GFR SERPL CREATININE-BSD FRML MDRD: 66 ML/MIN/1.73
GLUCOSE BLD-MCNC: 83 MG/DL (ref 65–99)
HCT VFR BLD AUTO: 26.4 % (ref 35.6–45.5)
HGB BLD-MCNC: 8.9 G/DL (ref 11.9–15.5)
MCH RBC QN AUTO: 21.3 PG (ref 26.9–32)
MCHC RBC AUTO-ENTMCNC: 33.7 G/DL (ref 32.4–36.3)
MCV RBC AUTO: 63.2 FL (ref 80.5–98.2)
PLATELET # BLD AUTO: 358 10*3/MM3 (ref 140–500)
PMV BLD AUTO: 9.7 FL (ref 6–12)
POTASSIUM BLD-SCNC: 4.2 MMOL/L (ref 3.5–5.2)
RBC # BLD AUTO: 4.18 10*6/MM3 (ref 3.9–5.2)
SODIUM BLD-SCNC: 139 MMOL/L (ref 136–145)
WBC NRBC COR # BLD: 6.26 10*3/MM3 (ref 4.5–10.7)

## 2017-10-13 PROCEDURE — 25010000002 HEPARIN (PORCINE) PER 1000 UNITS: Performed by: INTERNAL MEDICINE

## 2017-10-13 PROCEDURE — 87493 C DIFF AMPLIFIED PROBE: CPT | Performed by: INTERNAL MEDICINE

## 2017-10-13 PROCEDURE — 80048 BASIC METABOLIC PNL TOTAL CA: CPT | Performed by: INTERNAL MEDICINE

## 2017-10-13 PROCEDURE — 85027 COMPLETE CBC AUTOMATED: CPT | Performed by: INTERNAL MEDICINE

## 2017-10-13 PROCEDURE — 74020 HC XR ABDOMEN FLAT & UPRIGHT: CPT

## 2017-10-13 PROCEDURE — 99232 SBSQ HOSP IP/OBS MODERATE 35: CPT | Performed by: INTERNAL MEDICINE

## 2017-10-13 PROCEDURE — 83921 ORGANIC ACID SINGLE QUANT: CPT | Performed by: INTERNAL MEDICINE

## 2017-10-13 RX ORDER — POLYETHYLENE GLYCOL 3350, SODIUM CHLORIDE, POTASSIUM CHLORIDE, SODIUM BICARBONATE, AND SODIUM SULFATE 240; 5.84; 2.98; 6.72; 22.72 G/4L; G/4L; G/4L; G/4L; G/4L
4000 POWDER, FOR SOLUTION ORAL ONCE
Status: COMPLETED | OUTPATIENT
Start: 2017-10-13 | End: 2017-10-13

## 2017-10-13 RX ADMIN — HYDROCODONE BITARTRATE AND ACETAMINOPHEN 1 TABLET: 7.5; 325 TABLET ORAL at 16:44

## 2017-10-13 RX ADMIN — HYDROCODONE BITARTRATE AND ACETAMINOPHEN 1 TABLET: 7.5; 325 TABLET ORAL at 06:45

## 2017-10-13 RX ADMIN — POLYETHYLENE GLYCOL 3350, SODIUM CHLORIDE, POTASSIUM CHLORIDE, SODIUM BICARBONATE, AND SODIUM SULFATE 4000 ML: 240; 5.84; 2.98; 6.72; 22.72 POWDER, FOR SOLUTION ORAL at 19:00

## 2017-10-13 RX ADMIN — HYDROCODONE BITARTRATE AND ACETAMINOPHEN 1 TABLET: 7.5; 325 TABLET ORAL at 21:06

## 2017-10-13 RX ADMIN — HYDROCODONE BITARTRATE AND ACETAMINOPHEN 1 TABLET: 7.5; 325 TABLET ORAL at 11:54

## 2017-10-13 RX ADMIN — HEPARIN SODIUM 5000 UNITS: 5000 INJECTION, SOLUTION INTRAVENOUS; SUBCUTANEOUS at 09:01

## 2017-10-13 NOTE — PAYOR COMM NOTE
"Dang Thomas (57 y.o. Female)     Date of Birth Social Security Number Address Home Phone MRN    1960  [place] [address] [phone] [id number]    Taoist Marital Status          Confucianist        Admission Date Admission Type Admitting Provider Attending Provider Department, Room/Bed    10/9/17 Emergency Vilma Ann MD Hayden, Juliana, MD [address], P691/1    Discharge Date Discharge Disposition Discharge Destination                      Attending Provider: Peg Arreguin MD     Allergies:  Oxycodone    Isolation:  None   Infection:  None   Code Status:  FULL    Ht:  60\" (152.4 cm)   Wt:  156 lb (70.8 kg)    Admission Cmt:  None   Principal Problem:  Generalized abdominal pain [R10.84]                 Active Insurance as of 10/9/2017     Primary Coverage     Payor Plan Insurance Group Employer/Plan Group    [employer] BLUE CROSS [employer] BLUE CROSS BLUE SHIELD PPO [id number]     Payor Plan Address Payor Plan Phone Number Effective From Effective To    [address] [phone] 1/1/2014     [place] [address]       Subscriber Name Subscriber Birth Date Member ID       DANG THOMAS 1960 [id number]                 Emergency Contacts      (Rel.) Home Phone Work Phone Mobile Phone    Antonio Thomas (Spouse) [phone] -- --        ATTN: NURSE REVIEW    REF#[id number]  PLEASE CALL BACK TO LAURA [phone]  OR  [phone]  THANKS!  [facility] Medications (all)       Dose Frequency Start End    heparin (porcine) 5000 UNIT/ML injection 5,000 Units 5,000 Units Every 12 Hours Scheduled 10/9/2017     Sig - Route: Inject 1 mL under the skin Every 12 (Twelve) Hours. - Subcutaneous    HYDROcodone-acetaminophen (NORCO) 7.5-325 MG per tablet 1 tablet 1 tablet Every 4 Hours PRN 10/9/2017 10/19/2017    Sig - Route: Take 1 tablet by mouth Every 4 (Four) Hours As Needed for Moderate Pain . - Oral    lactulose (CHRONULAC) " 10 GM/15ML solution 20 g 20 g Daily PRN 10/12/2017     Sig - Route: Take 30 mL by mouth Daily As Needed (lactulose). - Oral    morphine injection 2 mg 2 mg Every 4 Hours PRN 10/9/2017 10/19/2017    Sig - Route: Infuse 1 mL into a venous catheter Every 4 (Four) Hours As Needed for Severe Pain . - Intravenous    ondansetron (ZOFRAN) injection 4 mg 4 mg Every 6 Hours PRN 10/9/2017     Sig - Route: Infuse 2 mL into a venous catheter Every 6 (Six) Hours As Needed for Nausea or Vomiting. - Intravenous    polyethylene glycol with electrolytes (GOLYTELY) solution 4,000 mL 4,000 mL Once 10/13/2017     Sig - Route: Take 4,000 mL by mouth 1 (One) Time. - Oral    sennosides-docusate sodium (SENOKOT-S) 8.6-50 MG tablet 2 tablet 2 tablet 2 Times Daily PRN 10/12/2017     Sig - Route: Take 2 tablets by mouth 2 (Two) Times a Day As Needed for Constipation. - Oral    sodium chloride 0.9 % bolus 500 mL 500 mL Once 10/9/2017 10/9/2017    Sig - Route: Infuse 500 mL into a venous catheter 1 (One) Time. - Intravenous    sodium chloride 0.9 % flush 1-10 mL 1-10 mL As Needed 10/9/2017     Sig - Route: Infuse 1-10 mL into a venous catheter As Needed for Line Care. - Intravenous    sodium chloride 0.9 % flush 10 mL 10 mL As Needed 10/9/2017     Sig - Route: Infuse 10 mL into a venous catheter As Needed for Line Care. - Intravenous    Cosign for Ordering: Accepted by Anurag Green MD on 10/9/2017  1:49 PM    bisacodyl (DULCOLAX) suppository 10 mg (Discontinued) 10 mg 2 Times Daily 10/10/2017 10/13/2017    Sig - Route: Insert 1 suppository into the rectum 2 (Two) Times a Day. - Rectal    lactulose (CHRONULAC) 10 GM/15ML solution 20 g (Discontinued) 20 g Daily 10/11/2017 10/12/2017    Sig - Route: Take 30 mL by mouth Daily. - Oral    sennosides-docusate sodium (SENOKOT-S) 8.6-50 MG tablet 2 tablet (Discontinued) 2 tablet Nightly 10/10/2017 10/10/2017    Sig - Route: Take 2 tablets by mouth Every Night. - Oral    sennosides-docusate sodium  "(SENOKOT-S) 8.6-50 MG tablet 2 tablet (Discontinued) 2 tablet 2 Times Daily 10/10/2017 10/12/2017    Sig - Route: Take 2 tablets by mouth 2 (Two) Times a Day. - Oral    sodium chloride 0.9 % infusion (Discontinued) 50 mL/hr Continuous 10/9/2017 10/12/2017    Sig - Route: Infuse 50 mL/hr into a venous catheter Continuous. - Intravenous             Physician Progress Notes (last 24 hours) (Notes from 10/12/2017  2:59 PM through 10/13/2017  2:59 PM)      Peg Arreguin MD at 10/12/2017  3:44 PM  Version 1 of 1              LOS: 3 days   Primary Care Physician: Petros oRach MD     Subjective   Still having abdominal pain.  Stools have been loose today.  Had a colonoscopy 5 years ago for screening and it was normal.  This was at [facility]    Vital Signs  Body mass index is 30.47 kg/(m^2).  Temp:  [97.4 °F (36.3 °C)-99.3 °F (37.4 °C)] 98.2 °F (36.8 °C)  Heart Rate:  [80-91] 84  Resp:  [16-18] 18  BP: (102-120)/(59-80) 102/59      Objective:  General Appearance:  Uncomfortable.    Vital signs: (most recent): Blood pressure 102/59, pulse 84, temperature 98.2 °F (36.8 °C), temperature source Oral, resp. rate 18, height 60\" (152.4 cm), weight 156 lb (70.8 kg), SpO2 95 %.    Lungs:  There are decreased breath sounds.  No wheezes, rales or rhonchi.    Heart: Normal rate.  Regular rhythm.  No murmur.   Abdomen: Abdomen is soft and distended.  Bowel sounds are normal.   There is generalized tenderness.   There is no splenomegaly. There is no hepatomegaly.   Extremities: There is no dependent edema.    Neurological: Patient is alert.          Results Review:    I reviewed the patient's new clinical results.      Results from last 7 days  Lab Units 10/12/17  0559 10/11/17  0700   WBC 10*3/mm3 5.58 6.02   HEMOGLOBIN g/dL 8.3* 8.9*   PLATELETS 10*3/mm3 300 307       Results from last 7 days  Lab Units 10/11/17  0700 10/10/17  0521   SODIUM mmol/L 139 139   POTASSIUM mmol/L 4.5 4.4   CHLORIDE mmol/L 106 104   CO2 " mmol/L 23.1 24.9   BUN mg/dL 9 15   CREATININE mg/dL 0.94 1.19*   CALCIUM mg/dL 9.2 9.2   GLUCOSE mg/dL 87 85       Results from last 7 days  Lab Units 10/09/17  1240   INR  1.12*     Hemoglobin A1C:No results found for: HGBA1C    Glucose Range:No results found for: POCGLU    Lab Results   Component Value Date    UFJIXWIC08 299 10/12/2017       No results found for: TSH    Assessment & Plan      Medication Review: Yes    Active Hospital Problems (** Indicates Principal Problem)    Diagnosis Date Noted   • **Generalized abdominal pain [R10.84] 10/09/2017   • Drug-induced constipation [K59.03] 10/11/2017   • Dehydration [E86.0] 10/10/2017   • Status post nephrectomy [Z90.5] 10/09/2017   • History of renal cell carcinoma [Z85.528] 10/09/2017      Resolved Hospital Problems    Diagnosis Date Noted Date Resolved   • Ileus [K56.7] 10/10/2017 10/11/2017   • GILBERTO (acute kidney injury) [N17.9] 10/09/2017 10/11/2017       Assessment/Plan  1.  Persistent abdominal pain.  Constipation appears improved.  Initial CAT scan showed some colon inflammation.  Last GI to see regarding further management.  Discussed this morning with Dr. Hernandez.  He thinks that her symptoms are fairly late to be related to her nephrectomy.  2.  Anemia with some acute blood loss and low iron stores.  B12 low normal.  Methylmalonic acid level ordered.  Recheck in a.m.  3.  Left nephrectomy for oncocytoma August 2017    Peg Arreguin MD  10/12/17  3:44 PM           Electronically signed by Peg Arreguin MD at 10/12/2017  3:48 PM      Orlando Hernandez MD at 10/13/2017  7:31 AM  Version 1 of 1         Cc abd distention     still co abd distention and some flank pain    Some abd pain also   Passing flatus and stool.   LOS: 4 days   Patient Care Team:  Petros Roach MD as PCP - General (Internal Medicine)        Subjective     History of Present Illness    Subjective      Objective     Vital Signs  Temp:  [97.3 °F (36.3 °C)-98.2 °F (36.8 °C)] 97.8  "°F (36.6 °C)  Heart Rate:  [84-87] 87  Resp:  [18] 18  BP: ()/(57-59) 99/57    Objective:  General Appearance:  Comfortable.    Vital signs: (most recent): Blood pressure 99/57, pulse 87, temperature 97.8 °F (36.6 °C), temperature source Oral, resp. rate 18, height 60\" (152.4 cm), weight 156 lb (70.8 kg), SpO2 99 %.  Vital signs are normal.    Output: Producing urine.    HEENT: Normal HEENT exam.    Lungs:  Normal respiratory rate and normal effort.    Abdomen: Abdomen is soft.              Results Review:  New clinical results reviewed.        Assessment/Plan     Principal Problem:    Generalized abdominal pain  Active Problems:    Status post nephrectomy    History of renal cell carcinoma    Dehydration    Drug-induced constipation      Assessment:  (Ileus and intraabd fluid.   Will follow   Abd series ordered. No good explanation for her current symptoms.).       Orlando Hernandez MD  10/13/17  7:32 AM               Electronically signed by Orlando Hernandez MD at 10/13/2017  7:34 AM      Sima Evangelista MD at 10/13/2017 10:49 AM  Version 1 of 1         [facility]  Inpatient Progress Note    Reason for Follow Up:  Abdominal pain    Subjective     Interval History:   Pain more intermittent.  Loose stool yesterday.  Tolerated diet but feeling early satiety, bloating, lower abdominal cramping.    Current Facility-Administered Medications:   •  bisacodyl (DULCOLAX) suppository 10 mg, 10 mg, Rectal, BID, Sarthak Thorpe Jr., MD, 10 mg at 10/12/17 0905  •  heparin (porcine) 5000 UNIT/ML injection 5,000 Units, 5,000 Units, Subcutaneous, Q12H, Vilma Ann MD, 5,000 Units at 10/13/17 0901  •  HYDROcodone-acetaminophen (NORCO) 7.5-325 MG per tablet 1 tablet, 1 tablet, Oral, Q4H PRN, Vilma Ann MD, 1 tablet at 10/13/17 0645  •  lactulose (CHRONULAC) 10 GM/15ML solution 20 g, 20 g, Oral, Daily PRN, Peg Arreguin MD  •  morphine injection 2 mg, 2 mg, Intravenous, Q4H PRN, Vilma Ann" MD, 2 mg at 10/09/17 2231  •  ondansetron (ZOFRAN) injection 4 mg, 4 mg, Intravenous, Q6H PRN, Vilma Ann MD, 4 mg at 10/10/17 1546  •  sennosides-docusate sodium (SENOKOT-S) 8.6-50 MG tablet 2 tablet, 2 tablet, Oral, BID PRN, Peg Arreguin MD  •  sodium chloride 0.9 % flush 1-10 mL, 1-10 mL, Intravenous, PRN, Vilma Ann MD  •  sodium chloride 0.9 % flush 10 mL, 10 mL, Intravenous, PRN, Anurag Green MD  Review of Systems:    All systems were reviewed and negative except for:  Gastrointestinal: postitive for  bloating / distention, early satiety and pain    Objective     Vital Signs  Temp:  [97.3 °F (36.3 °C)-98.9 °F (37.2 °C)] 98.9 °F (37.2 °C)  Heart Rate:  [83-87] 83  Resp:  [18] 18  BP: ()/(57-63) 114/63  Body mass index is 30.47 kg/(m^2).    Intake/Output Summary (Last 24 hours) at 10/13/17 1049  Last data filed at 10/13/17 0647   Gross per 24 hour   Intake              600 ml   Output             1600 ml   Net            -1000 ml           Physical Exam:   General: patient awake, alert and cooperative   Eyes: Normal lids and lashes, no scleral icterus   Neck: supple, normal ROM   Skin: warm and dry, not jaundiced   Cardiovascular: regular rhythm and rate, no murmurs auscultated   Pulm: clear to auscultation bilaterally, regular and unlabored   Abdomen: soft, nontender, nondistended; normal bowel sounds   Rectal: deferred   Extremities: no rash or edema   Psychiatric: Normal mood and behavior; memory intact     Results Review:     I reviewed the patient's new clinical results.      Results from last 7 days  Lab Units 10/13/17  0547 10/12/17  0559 10/11/17  0700   WBC 10*3/mm3 6.26 5.58 6.02   HEMOGLOBIN g/dL 8.9* 8.3* 8.9*   HEMATOCRIT % 26.4* 24.5* 26.0*   PLATELETS 10*3/mm3 358 300 307       Results from last 7 days  Lab Units 10/13/17  0547 10/11/17  0700 10/10/17  0521 10/09/17  1102   SODIUM mmol/L 139 139 139 140   POTASSIUM mmol/L 4.2 4.5 4.4 4.6   CHLORIDE mmol/L 103 106 104 100   CO2  mmol/L 24.2 23.1 24.9 26.6   BUN mg/dL 7 9 15 16   CREATININE mg/dL 1.05* 0.94 1.19* 1.39*   CALCIUM mg/dL 9.2 9.2 9.2 10.5   BILIRUBIN mg/dL  --   --  0.4 0.5   ALK PHOS U/L  --   --  73 96   ALT (SGPT) U/L  --   --  <5 7   AST (SGOT) U/L  --   --  6 9   GLUCOSE mg/dL 83 87 85 105*       Results from last 7 days  Lab Units 10/09/17  1240   INR  1.12*       Lab Results  Lab Value Date/Time   LIPASE 12 (L) 10/09/2017 1102       Radiology:  XR Abdomen KUB   Final Result   No acute process.       This report was finalized on 10/12/2017 10:43 AM by Dr. Lenny Fletcher MD.          XR Abdomen KUB   Final Result      CT Abdomen Pelvis Without Contrast   Final Result   1. Status post left nephrectomy. The midleft colon demonstrates wall   thickening within the operative bed. This finding is likely reactive due   to recent surgery although colitis remains on the differential. There is   moderate intra-abdominal fluid that demonstrates relatively low   attenuation values ranging from 15 to 25 Hounsfield units.    2. Bilateral small layering effusions with bibasilar atelectasis within   the lower lobes.   3. Small hypodense liver lesion. The imaging findings are nonspecific   and correlation with remote imaging would be most helpful.       These findings were discussed with Dr. Green by telephone at 12:28 PM   on 10/09/2017.       Radiation dose reduction techniques were utilized, including automated   exposure control and exposure modulation based on body size.       This report was finalized on 10/10/2017 11:42 AM by Dr. Charlene Ferreira MD.          XR Abdomen Flat & Upright    (Results Pending)       Assessment/Plan     Patient Active Problem List   Diagnosis   • Left renal mass   • Generalized abdominal pain   • Status post nephrectomy   • History of renal cell carcinoma   • Dehydration   • Drug-induced constipation       Impression  1. Abdominal pain, early satiety: more intermittent but still present.  Knowledge  and does not feel this is related to her postoperative status.  Questionable as to whether this is a functional issue versus a dysbiosis issue versus following constipation issues  2. Abnormal CT: Thickening noted in the region of the postoperative left nephrectomy bed.  Most likely this is related to her postoperative state though the urologist do not feel this is likely  3. Constipation: resolved with treatments, loose stool yesterday    Plan  -She is agreeable to colonoscopy for further evaluation of the pain and abnormality seen on CT imaging.  Will arrange for tomorrow  -We'll hold her bisacodyl suppositories for now  -may benefit from probiotics, FODMAPS diet in the future      I discussed the patients findings and my recommendations with patient and nursing staff.    Sima Evangelista MD     Electronically signed by Sima Evangelista MD at 10/13/2017 11:05 AM        Medical Student Notes (last 24 hours) (Notes from 10/12/2017  2:59 PM through 10/13/2017  2:59 PM)     No notes of this type exist for this encounter.           Consult Notes (last 24 hours) (Notes from 10/12/2017  2:59 PM through 10/13/2017  2:59 PM)      Barbie Zhu MD at 10/12/2017  7:30 PM  Version 1 of 1     Consult Orders:    1. Inpatient Consult to Gastroenterology [[id number]] ordered by Peg Arreguin MD at 10/12/17 153                [facility]  Initial Inpatient Consult Note    Referring Provider: Dr DORA Arreguin    Reason for Consultation: abdominal pain    Subjective     History of present illness:    57 y.o. female with history of recent nephrectomy for left renal mass in August presents with 2 week history of progressive abdominal discomfort.  She initially had a sensation of abdominal distension, bloating and early satiety.  This progressed to feel more painful and she had irregular BMs.  She attributed some changes to her Vit D replacement which she has stopped.  Since admission she has rcvd bowel  stimulants and has had looser stools and some crampy lower abdominal pain.  She denies fever or blood in her stool.    She felt well for 2 weeks beginning 2 weeks postop.  Her symptoms as described above then began.  She has had a previous colonoscopy 5 years ago at [facility] which she reports was normal.  She has a history of CRC in a first degree relative.  She has a CT this admission showing some thickness of the colon in the region of the nephrectomy bed.  Urology has commented that these changes would be unexpected this far out from her surgery.    Past Medical History:  Past Medical History:   Diagnosis Date   • Kidney tumor      Past Surgical History:  Past Surgical History:   Procedure Laterality Date   • BREAST LUMPECTOMY     • HYSTERECTOMY     • KNEE ARTHROSCOPY     • NEPHRECTOMY Left 8/28/2017    Procedure: HAND ASSISTED LEFT LAPAROSCOPIC NEPHRECTOMY ;  Surgeon: Sarthak Thorpe Jr., MD;  Location: [facility];  Service:       Social History:   Social History   Substance Use Topics   • Smoking status: Former Smoker     Packs/day: 0.50     Years: 30.00     Types: Cigarettes     Quit date: 8/25/2017   • Smokeless tobacco: Never Used      Comment: Last cigarette 8/25/17   • Alcohol use No      Family History:  Family History   Problem Relation Age of Onset   • Malig Hyperthermia Neg Hx        Home Meds:  No prescriptions prior to admission.     Current Meds:     bisacodyl 10 mg Rectal BID   heparin (porcine) 5,000 Units Subcutaneous Q12H     Allergies:  Allergies   Allergen Reactions   • Oxycodone Swelling     Review of Systems  All systems were reviewed and negative except for:  Gastrointestinal: postitive for  bloating / distention, change in bowel habits and early satiety  Musculoskeletal: positive for  back pain     Objective     Vital Signs  Temp:  [97.4 °F (36.3 °C)-99 °F (37.2 °C)] 98.2 °F (36.8 °C)  Heart Rate:  [80-85] 84  Resp:  [16-18] 18  BP: (102-112)/(57-69) 112/57  Physical  Exam:  General Appearance:    Alert, cooperative, in no acute distress   Head:    Normocephalic, without obvious abnormality, atraumatic   Eyes:            Lids and lashes normal, conjunctivae and sclerae normal, no   icterus   Throat:   No oral lesions, no thrush, oral mucosa moist       Lungs:     Clear to auscultation,respirations regular, even and                   unlabored    Heart:    Regular rhythm and normal rate, normal S1 and S2, no            murmur   Chest Wall:    No abnormalities observed   Abdomen:     Slightly distended, nontender normal bs   Rectal:     Deferred   Extremities:   no edema   Skin:   No bleeding, bruising or rash   Lymph nodes:   No palpable adenopathy   Psychiatric:  Judgement and insight: normal   Orientation to person place and time: normal   Mood and affect: normal   Results Review:   I reviewed the patient's new clinical results.      Results from last 7 days  Lab Units 10/12/17  0559 10/11/17  0700 10/10/17  1515 10/10/17  0522   WBC 10*3/mm3 5.58 6.02  --  5.77   HEMOGLOBIN g/dL 8.3* 8.9* 8.9* 8.7*   HEMATOCRIT % 24.5* 26.0* 25.5* 25.7*   PLATELETS 10*3/mm3 300 307  --  274       Results from last 7 days  Lab Units 10/11/17  0700 10/10/17  0521 10/09/17  1102   SODIUM mmol/L 139 139 140   POTASSIUM mmol/L 4.5 4.4 4.6   CHLORIDE mmol/L 106 104 100   CO2 mmol/L 23.1 24.9 26.6   BUN mg/dL 9 15 16   CREATININE mg/dL 0.94 1.19* 1.39*   CALCIUM mg/dL 9.2 9.2 10.5   BILIRUBIN mg/dL  --  0.4 0.5   ALK PHOS U/L  --  73 96   ALT (SGPT) U/L  --  <5 7   AST (SGOT) U/L  --  6 9   GLUCOSE mg/dL 87 85 105*       Results from last 7 days  Lab Units 10/09/17  1240   INR  1.12*       Lab Results  Lab Value Date/Time   LIPASE 12 (L) 10/09/2017 1102       Radiology:  XR Abdomen KUB   Final Result   No acute process.       This report was finalized on 10/12/2017 10:43 AM by Dr. Lenny Fletcher MD.          XR Abdomen KUB   Final Result      CT Abdomen Pelvis Without Contrast   Final Result   1.  Status post left nephrectomy. The midleft colon demonstrates wall   thickening within the operative bed. This finding is likely reactive due   to recent surgery although colitis remains on the differential. There is   moderate intra-abdominal fluid that demonstrates relatively low   attenuation values ranging from 15 to 25 Hounsfield units.    2. Bilateral small layering effusions with bibasilar atelectasis within   the lower lobes.   3. Small hypodense liver lesion. The imaging findings are nonspecific   and correlation with remote imaging would be most helpful.       These findings were discussed with Dr. Green by telephone at 12:28 PM   on 10/09/2017.       Radiation dose reduction techniques were utilized, including automated   exposure control and exposure modulation based on body size.       This report was finalized on 10/10/2017 11:42 AM by Dr. Charlene Ferreira MD.              Assessment/Plan   Patient Active Problem List   Diagnosis   • Left renal mass   • Generalized abdominal pain   • Status post nephrectomy   • History of renal cell carcinoma   • Dehydration   • Drug-induced constipation     Impression:  1. Abdominal pain  2. Abnormal CT scan  3. Altered bowel habits    Plan:  - stool for c diff  - hold further bowel stimulants  - request imaging from [facility] - it sounds as though she may have had a PET scan  - she will likely warrant c/s for further eval if c diff neg    I discussed the patients findings and my recommendations with patient and family.    Barbie Zhu MD               Electronically signed by Barbie Zhu MD at 10/12/2017 10:01 PM

## 2017-10-13 NOTE — PLAN OF CARE
Problem: Patient Care Overview (Adult)  Goal: Plan of Care Review  Outcome: Ongoing (interventions implemented as appropriate)    10/13/17 0430   Coping/Psychosocial Response Interventions   Plan Of Care Reviewed With patient   Patient Care Overview   Progress no change   Outcome Evaluation   Outcome Summary/Follow up Plan vss, c/o pain x1 tonight medicated with po pain med, up ad alix in room, awaiting stool specimen       Goal: Adult Individualization and Mutuality  Outcome: Ongoing (interventions implemented as appropriate)  Goal: Discharge Needs Assessment  Outcome: Ongoing (interventions implemented as appropriate)    Problem: Pain, Acute (Adult)  Goal: Acceptable Pain Control/Comfort Level  Outcome: Ongoing (interventions implemented as appropriate)

## 2017-10-13 NOTE — PROGRESS NOTES
"     LOS: 4 days   Primary Care Physician: Petros Roach MD     Subjective   Feels the same.  Bloated and constipated.  No bowel movement today.  Abdomen continues to feel sore    Vital Signs  Body mass index is 30.47 kg/(m^2).  Temp:  [97.1 °F (36.2 °C)-98.9 °F (37.2 °C)] 97.1 °F (36.2 °C)  Heart Rate:  [76-87] 76  Resp:  [18] 18  BP: ()/(57-69) 118/69      Objective:  General Appearance:  In no acute distress.    Vital signs: (most recent): Blood pressure 118/69, pulse 76, temperature 97.1 °F (36.2 °C), temperature source Oral, resp. rate 18, height 60\" (152.4 cm), weight 156 lb (70.8 kg), SpO2 94 %.    Lungs:  There are decreased breath sounds.  No wheezes, rales or rhonchi.    Heart: Normal rate.  Regular rhythm.  No murmur.   Abdomen: Abdomen is soft and distended.  Bowel sounds are normal.   There is generalized tenderness.     Extremities: There is dependent edema.  (Trace)  Neurological: Patient is alert.          Results Review:    I reviewed the patient's new clinical results.      Results from last 7 days  Lab Units 10/13/17  0547 10/12/17  0559   WBC 10*3/mm3 6.26 5.58   HEMOGLOBIN g/dL 8.9* 8.3*   PLATELETS 10*3/mm3 358 300       Results from last 7 days  Lab Units 10/13/17  0547 10/11/17  0700   SODIUM mmol/L 139 139   POTASSIUM mmol/L 4.2 4.5   CHLORIDE mmol/L 103 106   CO2 mmol/L 24.2 23.1   BUN mg/dL 7 9   CREATININE mg/dL 1.05* 0.94   CALCIUM mg/dL 9.2 9.2   GLUCOSE mg/dL 83 87       Results from last 7 days  Lab Units 10/09/17  1240   INR  1.12*     Hemoglobin A1C:No results found for: HGBA1C    Glucose Range:No results found for: POCGLU    Lab Results   Component Value Date    MZDZXSRS89 299 10/12/2017       No results found for: TSH    Assessment & Plan      Medication Review: Yes    Active Hospital Problems (** Indicates Principal Problem)    Diagnosis Date Noted   • **Generalized abdominal pain [R10.84] 10/09/2017   • Drug-induced constipation [K59.03] 10/11/2017   • Dehydration " [E86.0] 10/10/2017   • Status post nephrectomy [Z90.5] 10/09/2017   • History of renal cell carcinoma [Z85.528] 10/09/2017   • Abnormal CT of the abdomen [R93.5] 10/09/2017      Resolved Hospital Problems    Diagnosis Date Noted Date Resolved   • Ileus [K56.7] 10/10/2017 10/11/2017   • GILBERTO (acute kidney injury) [N17.9] 10/09/2017 10/11/2017       Assessment/Plan  1.  Persistent abdominal pain with bloating.  Colonoscopy to be done tomorrow.  2.  Anemia with low iron stores.  Methylmalonic acid level pending.  B12 low normal.  Hemoglobin improved today.  3.  Left nephrectomy for oncocytoma August 2017    Peg Arreguin MD  10/13/17  3:23 PM

## 2017-10-13 NOTE — PROGRESS NOTES
"Cc abd distention     still co abd distention and some flank pain    Some abd pain also   Passing flatus and stool.   LOS: 4 days   Patient Care Team:  Petros Roach MD as PCP - General (Internal Medicine)        Subjective     History of Present Illness    Subjective      Objective     Vital Signs  Temp:  [97.3 °F (36.3 °C)-98.2 °F (36.8 °C)] 97.8 °F (36.6 °C)  Heart Rate:  [84-87] 87  Resp:  [18] 18  BP: ()/(57-59) 99/57    Objective:  General Appearance:  Comfortable.    Vital signs: (most recent): Blood pressure 99/57, pulse 87, temperature 97.8 °F (36.6 °C), temperature source Oral, resp. rate 18, height 60\" (152.4 cm), weight 156 lb (70.8 kg), SpO2 99 %.  Vital signs are normal.    Output: Producing urine.    HEENT: Normal HEENT exam.    Lungs:  Normal respiratory rate and normal effort.    Abdomen: Abdomen is soft.              Results Review:  New clinical results reviewed.        Assessment/Plan     Principal Problem:    Generalized abdominal pain  Active Problems:    Status post nephrectomy    History of renal cell carcinoma    Dehydration    Drug-induced constipation      Assessment:  (Ileus and intraabd fluid.   Will follow   Abd series ordered. No good explanation for her current symptoms.).       Orlando Hernandez MD  10/13/17  7:32 AM            "

## 2017-10-13 NOTE — PROGRESS NOTES
Continued Stay Note  [facility]     Patient Name: Kimi Thomas  MRN: [id number]  Today's Date: 10/13/2017    Admit Date: 10/9/2017          Discharge Plan       10/13/17 1738    Case Management/Social Work Plan    Plan Home    Patient/Family In Agreement With Plan yes    Additional Comments CCP met with pt who continues to anticipate no needs for d/c. CCP to follow to assist should d/c needs arise. Estefania More LCSW              Discharge Codes     None            Idalia More LCSW

## 2017-10-14 ENCOUNTER — ANESTHESIA (OUTPATIENT)
Dept: GASTROENTEROLOGY | Facility: HOSPITAL | Age: 57
End: 2017-10-14

## 2017-10-14 ENCOUNTER — ANESTHESIA EVENT (OUTPATIENT)
Dept: GASTROENTEROLOGY | Facility: HOSPITAL | Age: 57
End: 2017-10-14

## 2017-10-14 PROBLEM — E86.0 DEHYDRATION: Status: RESOLVED | Noted: 2017-10-10 | Resolved: 2017-10-14

## 2017-10-14 PROBLEM — R93.5 ABNORMAL CT OF THE ABDOMEN: Status: ACTIVE | Noted: 2017-10-09

## 2017-10-14 LAB
ANION GAP SERPL CALCULATED.3IONS-SCNC: 13.3 MMOL/L
BUN BLD-MCNC: 7 MG/DL (ref 6–20)
BUN/CREAT SERPL: 7.6 (ref 7–25)
C DIFF TOX GENS STL QL NAA+PROBE: NEGATIVE
CALCIUM SPEC-SCNC: 9.6 MG/DL (ref 8.6–10.5)
CHLORIDE SERPL-SCNC: 100 MMOL/L (ref 98–107)
CO2 SERPL-SCNC: 24.7 MMOL/L (ref 22–29)
CREAT BLD-MCNC: 0.92 MG/DL (ref 0.57–1)
DEPRECATED RDW RBC AUTO: 46.7 FL (ref 37–54)
ERYTHROCYTE [DISTWIDTH] IN BLOOD BY AUTOMATED COUNT: 20.5 % (ref 11.7–13)
GFR SERPL CREATININE-BSD FRML MDRD: 77 ML/MIN/1.73
GLUCOSE BLD-MCNC: 86 MG/DL (ref 65–99)
HCT VFR BLD AUTO: 26.2 % (ref 35.6–45.5)
HGB BLD-MCNC: 9 G/DL (ref 11.9–15.5)
MCH RBC QN AUTO: 21.4 PG (ref 26.9–32)
MCHC RBC AUTO-ENTMCNC: 34.4 G/DL (ref 32.4–36.3)
MCV RBC AUTO: 62.2 FL (ref 80.5–98.2)
PLATELET # BLD AUTO: 368 10*3/MM3 (ref 140–500)
PMV BLD AUTO: 9.1 FL (ref 6–12)
POTASSIUM BLD-SCNC: 3.7 MMOL/L (ref 3.5–5.2)
RBC # BLD AUTO: 4.21 10*6/MM3 (ref 3.9–5.2)
SODIUM BLD-SCNC: 138 MMOL/L (ref 136–145)
WBC NRBC COR # BLD: 6.59 10*3/MM3 (ref 4.5–10.7)

## 2017-10-14 PROCEDURE — 80048 BASIC METABOLIC PNL TOTAL CA: CPT | Performed by: INTERNAL MEDICINE

## 2017-10-14 PROCEDURE — 25010000002 HEPARIN (PORCINE) PER 1000 UNITS: Performed by: INTERNAL MEDICINE

## 2017-10-14 PROCEDURE — 85027 COMPLETE CBC AUTOMATED: CPT | Performed by: INTERNAL MEDICINE

## 2017-10-14 PROCEDURE — 25010000002 PROPOFOL 10 MG/ML EMULSION: Performed by: NURSE ANESTHETIST, CERTIFIED REGISTERED

## 2017-10-14 PROCEDURE — 0DJD8ZZ INSPECTION OF LOWER INTESTINAL TRACT, VIA NATURAL OR ARTIFICIAL OPENING ENDOSCOPIC: ICD-10-PCS | Performed by: INTERNAL MEDICINE

## 2017-10-14 PROCEDURE — 25010000002 MORPHINE PER 10 MG: Performed by: INTERNAL MEDICINE

## 2017-10-14 PROCEDURE — 25010000002 ONDANSETRON PER 1 MG: Performed by: INTERNAL MEDICINE

## 2017-10-14 PROCEDURE — 45378 DIAGNOSTIC COLONOSCOPY: CPT | Performed by: INTERNAL MEDICINE

## 2017-10-14 RX ORDER — LIDOCAINE HYDROCHLORIDE 20 MG/ML
INJECTION, SOLUTION INFILTRATION; PERINEURAL AS NEEDED
Status: DISCONTINUED | OUTPATIENT
Start: 2017-10-14 | End: 2017-10-14 | Stop reason: SURG

## 2017-10-14 RX ORDER — SODIUM CHLORIDE, SODIUM LACTATE, POTASSIUM CHLORIDE, CALCIUM CHLORIDE 600; 310; 30; 20 MG/100ML; MG/100ML; MG/100ML; MG/100ML
30 INJECTION, SOLUTION INTRAVENOUS CONTINUOUS
Status: DISCONTINUED | OUTPATIENT
Start: 2017-10-14 | End: 2017-10-17

## 2017-10-14 RX ORDER — PROPOFOL 10 MG/ML
VIAL (ML) INTRAVENOUS CONTINUOUS PRN
Status: DISCONTINUED | OUTPATIENT
Start: 2017-10-14 | End: 2017-10-14 | Stop reason: SURG

## 2017-10-14 RX ORDER — POLYETHYLENE GLYCOL 3350 17 G/17G
17 POWDER, FOR SOLUTION ORAL 2 TIMES DAILY
Status: DISCONTINUED | OUTPATIENT
Start: 2017-10-14 | End: 2017-10-19 | Stop reason: HOSPADM

## 2017-10-14 RX ADMIN — PROPOFOL 160 MCG/KG/MIN: 10 INJECTION, EMULSION INTRAVENOUS at 11:15

## 2017-10-14 RX ADMIN — LIDOCAINE HYDROCHLORIDE 60 MG: 20 INJECTION, SOLUTION INFILTRATION; PERINEURAL at 11:15

## 2017-10-14 RX ADMIN — MORPHINE SULFATE 2 MG: 2 INJECTION, SOLUTION INTRAMUSCULAR; INTRAVENOUS at 08:01

## 2017-10-14 RX ADMIN — SODIUM CHLORIDE, POTASSIUM CHLORIDE, SODIUM LACTATE AND CALCIUM CHLORIDE 30 ML/HR: 600; 310; 30; 20 INJECTION, SOLUTION INTRAVENOUS at 09:50

## 2017-10-14 RX ADMIN — HEPARIN SODIUM 5000 UNITS: 5000 INJECTION, SOLUTION INTRAVENOUS; SUBCUTANEOUS at 20:13

## 2017-10-14 RX ADMIN — HYDROCODONE BITARTRATE AND ACETAMINOPHEN 1 TABLET: 7.5; 325 TABLET ORAL at 20:13

## 2017-10-14 RX ADMIN — HYDROCODONE BITARTRATE AND ACETAMINOPHEN 1 TABLET: 7.5; 325 TABLET ORAL at 15:27

## 2017-10-14 RX ADMIN — ONDANSETRON 4 MG: 2 INJECTION INTRAMUSCULAR; INTRAVENOUS at 08:01

## 2017-10-14 RX ADMIN — SODIUM CHLORIDE, POTASSIUM CHLORIDE, SODIUM LACTATE AND CALCIUM CHLORIDE 30 ML/HR: 600; 310; 30; 20 INJECTION, SOLUTION INTRAVENOUS at 13:32

## 2017-10-14 RX ADMIN — POLYETHYLENE GLYCOL 3350 17 G: 17 POWDER, FOR SOLUTION ORAL at 17:59

## 2017-10-14 NOTE — PROGRESS NOTES
"Cc abd distention.   Still cramping and bloating  Still some flatus    LOS: 5 days   Patient Care Team:  Petros Roach MD as PCP - General (Internal Medicine)        Subjective     History of Present Illness    Subjective      Objective     Vital Signs  Temp:  [96.8 °F (36 °C)-98.2 °F (36.8 °C)] 98.2 °F (36.8 °C)  Heart Rate:  [77-97] 95  Resp:  [16-18] 16  BP: ()/(50-85) 124/69    Objective:  General Appearance:  Comfortable.    Vital signs: (most recent): Blood pressure 124/69, pulse 95, temperature 98.2 °F (36.8 °C), resp. rate 16, height 60\" (152.4 cm), weight 156 lb (70.8 kg), SpO2 95 %.  Vital signs are normal.    Output: Producing urine.    HEENT: Normal HEENT exam.    Lungs:  Normal respiratory rate and normal effort.    Abdomen: Abdomen is soft.              Results Review:  New clinical results reviewed.        Assessment/Plan     Principal Problem:    Generalized abdominal pain  Active Problems:    Status post nephrectomy    History of renal cell carcinoma    Dehydration    Drug-induced constipation    Abnormal CT of the abdomen      Assessment:  (Sp nephrectomy  With abd bloating will follow).       Orlando Hernandez MD  10/14/17  1:34 PM            "

## 2017-10-14 NOTE — PLAN OF CARE
Problem: Patient Care Overview (Adult)  Goal: Plan of Care Review  Outcome: Ongoing (interventions implemented as appropriate)    10/14/17 0321   Coping/Psychosocial Response Interventions   Plan Of Care Reviewed With patient   Patient Care Overview   Progress no change   Outcome Evaluation   Outcome Summary/Follow up Plan vss, medicated for pain x1 with po pain med, bowel prep started, npo at mn, stool sent, colonoscopy in am        Goal: Adult Individualization and Mutuality  Outcome: Ongoing (interventions implemented as appropriate)  Goal: Discharge Needs Assessment  Outcome: Ongoing (interventions implemented as appropriate)    Problem: Pain, Acute (Adult)  Goal: Acceptable Pain Control/Comfort Level  Outcome: Ongoing (interventions implemented as appropriate)

## 2017-10-14 NOTE — PLAN OF CARE
Problem: Patient Care Overview (Adult)  Goal: Plan of Care Review  Outcome: Ongoing (interventions implemented as appropriate)    10/13/17 1800   Coping/Psychosocial Response Interventions   Plan Of Care Reviewed With patient   Patient Care Overview   Progress improving   Outcome Evaluation   Outcome Summary/Follow up Plan VSS. medicated for pain. Changed to clear liquids. NPO at midnight. Colonoscopy tomorrow. Consent signed and on chart. Bowel prep to start at 1900. Awaiting stool specimen. Ambulated multiple times throughout shift.        Goal: Adult Individualization and Mutuality  Outcome: Ongoing (interventions implemented as appropriate)  Goal: Discharge Needs Assessment  Outcome: Ongoing (interventions implemented as appropriate)    Problem: Pain, Acute (Adult)  Goal: Acceptable Pain Control/Comfort Level  Outcome: Ongoing (interventions implemented as appropriate)

## 2017-10-14 NOTE — PROGRESS NOTES
"Brief (courtesy) procedure note:    Procedure: Colonoscopy    Pre-op diagnosis: Abnormal imaging.     Post-op diagnosis: Very sharp \"knuckle\" of bowel at 45 cm. It could not be traversed with a pediatric colonoscope but it could with a gastroscope and it was bland and not obstructive. The gastroscope could not be advanced further because of buckling.     Recommendations: Keep stools soft (Miralax, etc) and consider restudy on an interval basis.     Please refer to the Provation-generated note for photos and further details.     Oswaldo Rico MD    "

## 2017-10-14 NOTE — ANESTHESIA POSTPROCEDURE EVALUATION
"Patient: Kimi Thomas    Procedure Summary     Date Anesthesia Start Anesthesia Stop Room / Location    10/14/17 1055 1150  [facility]       Procedure Diagnosis Surgeon Provider    COLONOSCOPY to 50CM  (N/A ) Generalized abdominal pain; Abnormal CT of the abdomen  (Generalized abdominal pain [R10.84]; Abnormal CT of the abdomen [R93.5]) MD Ирина Shaw MD          Anesthesia Type: MAC  Last vitals  BP   124/69 (10/14/17 1200)    Temp   36.8 °C (98.2 °F) (10/14/17 1157)    Pulse   95 (10/14/17 1200)   Resp   16 (10/14/17 1200)    SpO2   95 % (10/14/17 1200)      Post Anesthesia Care and Evaluation    Patient location during evaluation: bedside  Patient participation: complete - patient participated  Level of consciousness: awake and alert  Pain management: adequate  Airway patency: patent  Anesthetic complications: No anesthetic complications    Cardiovascular status: acceptable  Respiratory status: acceptable  Hydration status: acceptable    Comments: /69  Pulse 95  Temp 36.8 °C (98.2 °F)  Resp 16  Ht 60\" (152.4 cm)  Wt 156 lb (70.8 kg)  SpO2 95%  BMI 30.47 kg/m2      "

## 2017-10-14 NOTE — PLAN OF CARE
Problem: Patient Care Overview (Adult)  Goal: Plan of Care Review  Outcome: Ongoing (interventions implemented as appropriate)    10/14/17 1404   Coping/Psychosocial Response Interventions   Plan Of Care Reviewed With patient   Patient Care Overview   Progress improving   Outcome Evaluation   Outcome Summary/Follow up Plan       10/14/17 1404   Coping/Psychosocial Response Interventions   Plan Of Care Reviewed With patient   Patient Care Overview   Progress improving   Outcome Evaluation   Outcome Summary/Follow up Plan 7-3 pm...VSS. Medicated for nausea and pain x1 prior to Cscope this am. Diet advanced to soft. c/o abdominal bloat, gas pain and distention. Encouraged to ambulate. Planning to see how diet goes for further recommendations.     7-3 pm...VSS. Medicated for nausea and pain x1 prior to Cscope this am. Diet advanced to soft. c/o abdominal bloat, gas pain and distention. Encouraged to ambulate.       Goal: Adult Individualization and Mutuality  Outcome: Ongoing (interventions implemented as appropriate)  Goal: Discharge Needs Assessment  Outcome: Ongoing (interventions implemented as appropriate)    Problem: Pain, Acute (Adult)  Goal: Acceptable Pain Control/Comfort Level  Outcome: Ongoing (interventions implemented as appropriate)

## 2017-10-14 NOTE — ANESTHESIA PREPROCEDURE EVALUATION
Anesthesia Evaluation     Patient summary reviewed and Nursing notes reviewed   NPO Solid Status: > 8 hours  NPO Liquid Status: > 2 hours     Airway   Mallampati: II  no difficulty expected  Dental - normal exam     Pulmonary - normal exam   Cardiovascular - normal exam    ECG reviewed        Neuro/Psych  GI/Hepatic/Renal/Endo    (+)  renal disease,     Musculoskeletal     Abdominal    Substance History      OB/GYN          Other      history of cancer                                    Anesthesia Plan    ASA 2     MAC     Anesthetic plan and risks discussed with patient.

## 2017-10-14 NOTE — PROGRESS NOTES
"     LOS: 5 days   Primary Care Physician: Petros Roach MD     Subjective   Feels the same.  Had colonoscopy earlier today.  Bloated and with diffuse abdominal discomfort.  No nausea or vomiting.  Hungry and would like to eat.    Vital Signs  Body mass index is 30.47 kg/(m^2).  Temp:  [96.8 °F (36 °C)-98.2 °F (36.8 °C)] 98.2 °F (36.8 °C)  Heart Rate:  [77-97] 95  Resp:  [16-18] 16  BP: ()/(50-85) 124/69      Objective:  General Appearance:  In no acute distress.    Vital signs: (most recent): Blood pressure 124/69, pulse 95, temperature 98.2 °F (36.8 °C), resp. rate 16, height 60\" (152.4 cm), weight 156 lb (70.8 kg), SpO2 95 %.    Lungs:  There are decreased breath sounds.  No wheezes, rales or rhonchi.    Heart: Normal rate.  Regular rhythm.  No murmur.   Abdomen: Abdomen is soft and distended.  Bowel sounds are normal.   There is generalized tenderness.   There is no splenomegaly. There is no hepatomegaly.   Extremities: There is dependent edema.  (Trace if any)  Neurological: Patient is alert.          Results Review:    I reviewed the patient's new clinical results.      Results from last 7 days  Lab Units 10/14/17  0656 10/13/17  0547   WBC 10*3/mm3 6.59 6.26   HEMOGLOBIN g/dL 9.0* 8.9*   PLATELETS 10*3/mm3 368 358       Results from last 7 days  Lab Units 10/14/17  0656 10/13/17  0547   SODIUM mmol/L 138 139   POTASSIUM mmol/L 3.7 4.2   CHLORIDE mmol/L 100 103   CO2 mmol/L 24.7 24.2   BUN mg/dL 7 7   CREATININE mg/dL 0.92 1.05*   CALCIUM mg/dL 9.6 9.2   GLUCOSE mg/dL 86 83       Results from last 7 days  Lab Units 10/09/17  1240   INR  1.12*     Hemoglobin A1C:No results found for: HGBA1C    Glucose Range:No results found for: POCGLU    Lab Results   Component Value Date    DOROUXEJ11 299 10/12/2017       No results found for: TSH    Assessment & Plan      Medication Review: Yes    Active Hospital Problems (** Indicates Principal Problem)    Diagnosis Date Noted   • **Generalized abdominal pain " "[R10.84] 10/09/2017   • Drug-induced constipation [K59.03] 10/11/2017   • Status post nephrectomy [Z90.5] 10/09/2017   • History of renal cell carcinoma [Z85.528] 10/09/2017   • Abnormal CT of the abdomen [R93.5] 10/09/2017      Resolved Hospital Problems    Diagnosis Date Noted Date Resolved   • Ileus [K56.7] 10/10/2017 10/11/2017   • Dehydration [E86.0] 10/10/2017 10/14/2017   • GILBERTO (acute kidney injury) [N17.9] 10/09/2017 10/11/2017       Assessment/Plan  1.  Persistent abdominal pain.  Colonoscopy showed a \"knuckle\" at 45 cm.  Scope could not be advanced past this.  Await further input from GI.  Rosa M to restart diet.  Increase activity.  C. difficile negative  2.  Anemia with low iron stores and low normal B12.  Hemoglobin stable.  Methylmalonic acid level pending  3.  Left nephrectomy for oncocytoma August 2017    Peg Arreguin MD  10/14/17  1:51 PM        "

## 2017-10-15 LAB
ANION GAP SERPL CALCULATED.3IONS-SCNC: 12.3 MMOL/L
BUN BLD-MCNC: 12 MG/DL (ref 6–20)
BUN/CREAT SERPL: 11.9 (ref 7–25)
CALCIUM SPEC-SCNC: 9.4 MG/DL (ref 8.6–10.5)
CHLORIDE SERPL-SCNC: 100 MMOL/L (ref 98–107)
CO2 SERPL-SCNC: 25.7 MMOL/L (ref 22–29)
CREAT BLD-MCNC: 1.01 MG/DL (ref 0.57–1)
DEPRECATED RDW RBC AUTO: 46.7 FL (ref 37–54)
ERYTHROCYTE [DISTWIDTH] IN BLOOD BY AUTOMATED COUNT: 20.7 % (ref 11.7–13)
GFR SERPL CREATININE-BSD FRML MDRD: 69 ML/MIN/1.73
GLUCOSE BLD-MCNC: 90 MG/DL (ref 65–99)
HCT VFR BLD AUTO: 23.7 % (ref 35.6–45.5)
HGB BLD-MCNC: 8.1 G/DL (ref 11.9–15.5)
MCH RBC QN AUTO: 21.2 PG (ref 26.9–32)
MCHC RBC AUTO-ENTMCNC: 34.2 G/DL (ref 32.4–36.3)
MCV RBC AUTO: 62 FL (ref 80.5–98.2)
PLATELET # BLD AUTO: 361 10*3/MM3 (ref 140–500)
PMV BLD AUTO: 9.1 FL (ref 6–12)
POTASSIUM BLD-SCNC: 3.7 MMOL/L (ref 3.5–5.2)
RBC # BLD AUTO: 3.82 10*6/MM3 (ref 3.9–5.2)
SODIUM BLD-SCNC: 138 MMOL/L (ref 136–145)
WBC NRBC COR # BLD: 6.4 10*3/MM3 (ref 4.5–10.7)

## 2017-10-15 PROCEDURE — 80048 BASIC METABOLIC PNL TOTAL CA: CPT | Performed by: INTERNAL MEDICINE

## 2017-10-15 PROCEDURE — 25010000002 HEPARIN (PORCINE) PER 1000 UNITS: Performed by: INTERNAL MEDICINE

## 2017-10-15 PROCEDURE — 85027 COMPLETE CBC AUTOMATED: CPT | Performed by: INTERNAL MEDICINE

## 2017-10-15 PROCEDURE — 99232 SBSQ HOSP IP/OBS MODERATE 35: CPT | Performed by: INTERNAL MEDICINE

## 2017-10-15 RX ORDER — MAGNESIUM CARB/ALUMINUM HYDROX 105-160MG
296 TABLET,CHEWABLE ORAL ONCE
Status: COMPLETED | OUTPATIENT
Start: 2017-10-15 | End: 2017-10-15

## 2017-10-15 RX ORDER — BISACODYL 10 MG
10 SUPPOSITORY, RECTAL RECTAL ONCE
Status: COMPLETED | OUTPATIENT
Start: 2017-10-16 | End: 2017-10-16

## 2017-10-15 RX ORDER — BISACODYL 5 MG/1
20 TABLET, DELAYED RELEASE ORAL ONCE
Status: COMPLETED | OUTPATIENT
Start: 2017-10-15 | End: 2017-10-15

## 2017-10-15 RX ADMIN — POLYETHYLENE GLYCOL 3350 17 G: 17 POWDER, FOR SOLUTION ORAL at 08:29

## 2017-10-15 RX ADMIN — HYDROCODONE BITARTRATE AND ACETAMINOPHEN 1 TABLET: 7.5; 325 TABLET ORAL at 20:00

## 2017-10-15 RX ADMIN — HEPARIN SODIUM 5000 UNITS: 5000 INJECTION, SOLUTION INTRAVENOUS; SUBCUTANEOUS at 08:29

## 2017-10-15 RX ADMIN — HYDROCODONE BITARTRATE AND ACETAMINOPHEN 1 TABLET: 7.5; 325 TABLET ORAL at 13:38

## 2017-10-15 RX ADMIN — HYDROCODONE BITARTRATE AND ACETAMINOPHEN 1 TABLET: 7.5; 325 TABLET ORAL at 04:24

## 2017-10-15 RX ADMIN — Medication 296 ML: at 19:54

## 2017-10-15 RX ADMIN — HEPARIN SODIUM 5000 UNITS: 5000 INJECTION, SOLUTION INTRAVENOUS; SUBCUTANEOUS at 20:09

## 2017-10-15 RX ADMIN — BISACODYL 20 MG: 5 TABLET, COATED ORAL at 22:14

## 2017-10-15 NOTE — PROGRESS NOTES
[facility]     LOS: 6 days     Subjective:  No cp  No soa  Eating ok      Objective:    Vital Signs:  Temp:  [97 °F (36.1 °C)-98.2 °F (36.8 °C)] 97.1 °F (36.2 °C)  Heart Rate:  [] 75  Resp:  [16] 16  BP: ()/(53-72) 116/63    General Appearance: no acute distress, appears comfortable  Heart: regular rate and rhythm  Lungs: clear to auscultation bilaterally, respirations unlabored, good air entry  Abdomen: soft, non-tender, no guarding, no rebound, non-distended  Extremities: no edema, no cyanosis  Neurology: speech normal, CN grossly normal  Psychiatric: normal mood and affect    Results Review:    Glucose   Date Value Ref Range Status   10/15/2017 90 65 - 99 mg/dL Final   10/14/2017 86 65 - 99 mg/dL Final   10/13/2017 83 65 - 99 mg/dL Final       Results from last 7 days  Lab Units 10/15/17  0539   WBC 10*3/mm3 6.40   HEMOGLOBIN g/dL 8.1*   HEMATOCRIT % 23.7*   PLATELETS 10*3/mm3 361       Results from last 7 days  Lab Units 10/15/17  0539  10/10/17  0521   SODIUM mmol/L 138  < > 139   POTASSIUM mmol/L 3.7  < > 4.4   CHLORIDE mmol/L 100  < > 104   CO2 mmol/L 25.7  < > 24.9   BUN mg/dL 12  < > 15   CREATININE mg/dL 1.01*  < > 1.19*   CALCIUM mg/dL 9.4  < > 9.2   BILIRUBIN mg/dL  --   --  0.4   ALK PHOS U/L  --   --  73   ALT (SGPT) U/L  --   --  <5   AST (SGOT) U/L  --   --  6   GLUCOSE mg/dL 90  < > 85   < > = values in this interval not displayed.    Results from last 7 days  Lab Units 10/09/17  1240   INR  1.12*                      I have reviewed daily medications and changes in CPOE    Scheduled meds    heparin (porcine) 5,000 Units Subcutaneous Q12H   polyethylene glycol 17 g Oral BID         lactated ringers 30 mL/hr Last Rate: 30 mL/hr (10/14/17 1332)     PRN meds  HYDROcodone-acetaminophen  •  lactulose  •  Morphine  •  ondansetron  •  sennosides-docusate sodium  •  sodium chloride  •  sodium chloride      Principal Problem:    Generalized abdominal pain  Active  Problems:    Status post nephrectomy    History of renal cell carcinoma    Drug-induced constipation    Abnormal CT of the abdomen        Assessment/Plan:  Barium enemia    >35 minutes spent, > 1/2 time spent counseling and coordination of care     D/w kaye and nurse      Armando King MD  10/15/17  3:04 PM

## 2017-10-15 NOTE — PLAN OF CARE
Problem: Patient Care Overview (Adult)  Goal: Plan of Care Review  Outcome: Ongoing (interventions implemented as appropriate)    10/15/17 5311   Coping/Psychosocial Response Interventions   Plan Of Care Reviewed With patient   Patient Care Overview   Progress improving   Outcome Evaluation   Outcome Summary/Follow up Plan VSS. Min pain, med x1. Amb in smith often. Prep ordered for barium enema in am. CL tonight, NPO after midnight.        Goal: Adult Individualization and Mutuality  Outcome: Ongoing (interventions implemented as appropriate)  Goal: Discharge Needs Assessment  Outcome: Ongoing (interventions implemented as appropriate)    Problem: Pain, Acute (Adult)  Goal: Acceptable Pain Control/Comfort Level  Outcome: Ongoing (interventions implemented as appropriate)

## 2017-10-15 NOTE — PROGRESS NOTES
[facility]     Inpatient Follow Up Note    Patient Identification:  Name: Kimi Thomas  Age: 57 y.o.  Sex: female  :  1960  MRN: [id number]    Information from:patient and family    Chief Complaint   Patient presents with   • Abdominal Pain     CRAMPING, BLOATING WORSENING OVER THE PAST 2WKS   • Flank Pain     LEFT FLANK PAIN X2 WKS AND PAIN WITH URINATION; PT HAD LEFT NEPHRECTOMY AUG 28       History:   Feels better. None the worse for wear post procedure. Still with original symptoms.     Review of Systems:  Constitutional:  Negative   Cardiovascular:  Negative   Respiratory:  Negative             Problem List:  Patient Active Problem List    Diagnosis   • Drug-induced constipation [K59.03]   • Generalized abdominal pain [R10.84]   • Status post nephrectomy [Z90.5]   • History of renal cell carcinoma [Z85.528]   • Abnormal CT of the abdomen [R93.5]     Overview Note:     Added automatically from request for surgery [id number]     • Left renal mass [N28.89]     Current Meds:  MAR Reviewed  Scheduled Meds:  bisacodyl 20 mg Oral Once   [START ON 10/16/2017] bisacodyl 10 mg Rectal Once   heparin (porcine) 5,000 Units Subcutaneous Q12H   magnesium citrate 296 mL Oral Once   polyethylene glycol 17 g Oral BID     Continuous Infusions:  lactated ringers 30 mL/hr Last Rate: 30 mL/hr (10/14/17 1332)     PRN Meds:.HYDROcodone-acetaminophen  •  lactulose  •  Morphine  •  ondansetron  •  sennosides-docusate sodium  •  sodium chloride  •  sodium chloride  Allergies:  Allergies   Allergen Reactions   • Clindamycin Diarrhea, Nausea And Vomiting and Other (See Comments)     Blood in stool   • Oxycodone Swelling   • Amoxicillin-Pot Clavulanate Rash       Intake/Output:     Intake/Output Summary (Last 24 hours) at 10/15/17 1827  Last data filed at 10/15/17 1805   Gross per 24 hour   Intake             1480 ml   Output             1200 ml   Net              280 ml     New  "allergies/reactions:  None    Physical Exam:  Vitals:   Temp (24hrs), Av.6 °F (36.4 °C), Min:97 °F (36.1 °C), Max:98.2 °F (36.8 °C)    Temp:  [97 °F (36.1 °C)-98.2 °F (36.8 °C)] 97.6 °F (36.4 °C)  Heart Rate:  [] 89  Resp:  [16] 16  BP: ()/(53-75) 127/75  /75 (BP Location: Left arm, Patient Position: Lying)  Pulse 89  Temp 97.6 °F (36.4 °C) (Oral)   Resp 16  Ht 60\" (152.4 cm)  Wt 156 lb (70.8 kg)  SpO2 93%  BMI 30.47 kg/m2    Exam:  NAD  PERRLA. Sclerae and conjunctivae normal  HENT: external inspection normal. Hearing intact.  No respiratory distress.  Alert, oriented, normal affect.         DATA:  Radiology and Labs:   Recent Results (from the past 24 hour(s))   CBC (No Diff)    Collection Time: 10/15/17  5:39 AM   Result Value Ref Range    WBC 6.40 4.50 - 10.70 10*3/mm3    RBC 3.82 (L) 3.90 - 5.20 10*6/mm3    Hemoglobin 8.1 (L) 11.9 - 15.5 g/dL    Hematocrit 23.7 (L) 35.6 - 45.5 %    MCV 62.0 (L) 80.5 - 98.2 fL    MCH 21.2 (L) 26.9 - 32.0 pg    MCHC 34.2 32.4 - 36.3 g/dL    RDW 20.7 (H) 11.7 - 13.0 %    RDW-SD 46.7 37.0 - 54.0 fl    MPV 9.1 6.0 - 12.0 fL    Platelets 361 140 - 500 10*3/mm3   Basic Metabolic Panel    Collection Time: 10/15/17  5:39 AM   Result Value Ref Range    Glucose 90 65 - 99 mg/dL    BUN 12 6 - 20 mg/dL    Creatinine 1.01 (H) 0.57 - 1.00 mg/dL    Sodium 138 136 - 145 mmol/L    Potassium 3.7 3.5 - 5.2 mmol/L    Chloride 100 98 - 107 mmol/L    CO2 25.7 22.0 - 29.0 mmol/L    Calcium 9.4 8.6 - 10.5 mg/dL    eGFR  African Amer 69 >60 mL/min/1.73    BUN/Creatinine Ratio 11.9 7.0 - 25.0    Anion Gap 12.3 mmol/L       Assessment:   Problem List:   Principal Problem:    Generalized abdominal pain  Active Problems:    Status post nephrectomy    History of renal cell carcinoma    Drug-induced constipation    Abnormal CT of the abdomen    Still not sure what to make of the endoscopic findings yesterday.     Plan:   BE. Discussed with Dr King.       Oswaldo Rico, " MD [facility]  10/15/2017

## 2017-10-15 NOTE — PLAN OF CARE
Problem: Patient Care Overview (Adult)  Goal: Plan of Care Review  Outcome: Ongoing (interventions implemented as appropriate)    10/15/17 0353   Coping/Psychosocial Response Interventions   Plan Of Care Reviewed With patient   Patient Care Overview   Progress no change   Outcome Evaluation   Outcome Summary/Follow up Plan vss, minimal c/o pain medicated with po pain med, ambulated smith tonight, resting well       Goal: Adult Individualization and Mutuality  Outcome: Ongoing (interventions implemented as appropriate)  Goal: Discharge Needs Assessment  Outcome: Ongoing (interventions implemented as appropriate)    Problem: Pain, Acute (Adult)  Goal: Acceptable Pain Control/Comfort Level  Outcome: Ongoing (interventions implemented as appropriate)

## 2017-10-15 NOTE — PROGRESS NOTES
"Cc abd distention   doing fair   Still abd distention  Still some low abd pain. Still some back pain  No nv      LOS: 6 days   Patient Care Team:  Petros Roach MD as PCP - General (Internal Medicine)        Subjective     History of Present Illness    Subjective      Objective     Vital Signs  Temp:  [97 °F (36.1 °C)-98.2 °F (36.8 °C)] 97.1 °F (36.2 °C)  Heart Rate:  [] 75  Resp:  [16] 16  BP: ()/(53-72) 116/63    Objective:  General Appearance:  Comfortable.    Vital signs: (most recent): Blood pressure 116/63, pulse 75, temperature 97.1 °F (36.2 °C), temperature source Oral, resp. rate 16, height 60\" (152.4 cm), weight 156 lb (70.8 kg), SpO2 95 %.  Vital signs are normal.    Output: Producing urine and producing stool.    HEENT: Normal HEENT exam.    Lungs:  Normal respiratory rate and normal effort.    Abdomen: Abdomen is distended.              Results Review:  New clinical results reviewed.        Assessment/Plan     Principal Problem:    Generalized abdominal pain  Active Problems:    Status post nephrectomy    History of renal cell carcinoma    Drug-induced constipation    Abnormal CT of the abdomen      Assessment:  (Ongoing gi issues   Will follow).       Orlando Hernandez MD  10/15/17  11:51 AM            "

## 2017-10-16 ENCOUNTER — APPOINTMENT (OUTPATIENT)
Dept: CT IMAGING | Facility: HOSPITAL | Age: 57
End: 2017-10-16

## 2017-10-16 ENCOUNTER — APPOINTMENT (OUTPATIENT)
Dept: GENERAL RADIOLOGY | Facility: HOSPITAL | Age: 57
End: 2017-10-16

## 2017-10-16 PROCEDURE — 25010000002 HEPARIN (PORCINE) PER 1000 UNITS: Performed by: INTERNAL MEDICINE

## 2017-10-16 PROCEDURE — 74020 HC XR ABDOMEN FLAT & UPRIGHT: CPT

## 2017-10-16 PROCEDURE — 99232 SBSQ HOSP IP/OBS MODERATE 35: CPT | Performed by: INTERNAL MEDICINE

## 2017-10-16 PROCEDURE — 74176 CT ABD & PELVIS W/O CONTRAST: CPT

## 2017-10-16 RX ORDER — SIMETHICONE 80 MG
80 TABLET,CHEWABLE ORAL
Status: DISCONTINUED | OUTPATIENT
Start: 2017-10-16 | End: 2017-10-19 | Stop reason: HOSPADM

## 2017-10-16 RX ORDER — L.ACID,PARA/B.BIFIDUM/S.THERM 8B CELL
1 CAPSULE ORAL DAILY
Status: DISCONTINUED | OUTPATIENT
Start: 2017-10-16 | End: 2017-10-19 | Stop reason: HOSPADM

## 2017-10-16 RX ADMIN — HYDROCODONE BITARTRATE AND ACETAMINOPHEN 1 TABLET: 7.5; 325 TABLET ORAL at 08:47

## 2017-10-16 RX ADMIN — HYDROCODONE BITARTRATE AND ACETAMINOPHEN 1 TABLET: 7.5; 325 TABLET ORAL at 16:30

## 2017-10-16 RX ADMIN — HYDROCODONE BITARTRATE AND ACETAMINOPHEN 1 TABLET: 7.5; 325 TABLET ORAL at 20:30

## 2017-10-16 RX ADMIN — HEPARIN SODIUM 5000 UNITS: 5000 INJECTION, SOLUTION INTRAVENOUS; SUBCUTANEOUS at 08:44

## 2017-10-16 RX ADMIN — SIMETHICONE CHEW TAB 80 MG 80 MG: 80 TABLET ORAL at 18:44

## 2017-10-16 RX ADMIN — HEPARIN SODIUM 5000 UNITS: 5000 INJECTION, SOLUTION INTRAVENOUS; SUBCUTANEOUS at 20:15

## 2017-10-16 RX ADMIN — Medication 1 CAPSULE: at 15:01

## 2017-10-16 RX ADMIN — POLYETHYLENE GLYCOL 3350 17 G: 17 POWDER, FOR SOLUTION ORAL at 18:44

## 2017-10-16 RX ADMIN — BISACODYL 10 MG: 10 SUPPOSITORY RECTAL at 06:21

## 2017-10-16 NOTE — PLAN OF CARE
Problem: Patient Care Overview (Adult)  Goal: Plan of Care Review  Outcome: Ongoing (interventions implemented as appropriate)    10/16/17 4469   Coping/Psychosocial Response Interventions   Plan Of Care Reviewed With patient   Patient Care Overview   Progress improving   Outcome Evaluation   Outcome Summary/Follow up Plan VSS. Up ad alix, amb in [place]ith often and up to chair. Multiple BM this afternoon. CL diet. Prepped for barium enema this am, but cancelled pending abd CT to be done this afternoon. Suregery consulted and will review CT and make decide on further action re: barium enema or anything else. Norco akil well and taken when needed.       Goal: Adult Individualization and Mutuality  Outcome: Ongoing (interventions implemented as appropriate)  Goal: Discharge Needs Assessment  Outcome: Ongoing (interventions implemented as appropriate)    Problem: Pain, Acute (Adult)  Goal: Acceptable Pain Control/Comfort Level  Outcome: Ongoing (interventions implemented as appropriate)

## 2017-10-16 NOTE — PROGRESS NOTES
[facility]     LOS: 7 days     Subjective:  No cp  No soa  Eating ok      Objective:    Vital Signs:  Temp:  [97 °F (36.1 °C)-98.6 °F (37 °C)] 97.1 °F (36.2 °C)  Heart Rate:  [79-88] 88  Resp:  [16] 16  BP: (120-134)/(67-79) 131/74    General Appearance: no acute distress, appears comfortable  Heart: regular rate and rhythm  Lungs: clear to auscultation bilaterally, respirations unlabored, good air entry  Abdomen: soft, non-tender, no guarding, no rebound, non-distended  Extremities: no edema, no cyanosis  Neurology: speech normal, CN grossly normal  Psychiatric: normal mood and affect    Results Review:    Glucose   Date Value Ref Range Status   10/15/2017 90 65 - 99 mg/dL Final   10/14/2017 86 65 - 99 mg/dL Final       Results from last 7 days  Lab Units 10/15/17  0539   WBC 10*3/mm3 6.40   HEMOGLOBIN g/dL 8.1*   HEMATOCRIT % 23.7*   PLATELETS 10*3/mm3 361       Results from last 7 days  Lab Units 10/15/17  0539  10/10/17  0521   SODIUM mmol/L 138  < > 139   POTASSIUM mmol/L 3.7  < > 4.4   CHLORIDE mmol/L 100  < > 104   CO2 mmol/L 25.7  < > 24.9   BUN mg/dL 12  < > 15   CREATININE mg/dL 1.01*  < > 1.19*   CALCIUM mg/dL 9.4  < > 9.2   BILIRUBIN mg/dL  --   --  0.4   ALK PHOS U/L  --   --  73   ALT (SGPT) U/L  --   --  <5   AST (SGOT) U/L  --   --  6   GLUCOSE mg/dL 90  < > 85   < > = values in this interval not displayed.                       I have reviewed daily medications and changes in CPOE    Scheduled meds    heparin (porcine) 5,000 Units Subcutaneous Q12H   lactobacillus acidophilus 1 capsule Oral Daily   polyethylene glycol 17 g Oral BID   simethicone 80 mg Oral 4x Daily AC & at Bedtime         lactated ringers 30 mL/hr Last Rate: 30 mL/hr (10/14/17 1332)     PRN meds  HYDROcodone-acetaminophen  •  lactulose  •  Morphine  •  ondansetron  •  sennosides-docusate sodium  •  sodium chloride  •  sodium chloride      Principal Problem:    Generalized abdominal pain  Active  Problems:    Status post nephrectomy    History of renal cell carcinoma    Drug-induced constipation    Abnormal CT of the abdomen        Assessment/Plan:  CT of the abd and pelvis     Barium enema tomorrow if the Ct is unchanged from 10/9    Maintain clear liquids till tomorrow    D/w Dr Gallego, Paulino Cheek, Dr Oconnor    General surgery consult    >35 minutes spent, > 1/2 time spent counseling and coordination of care     Armando King MD  10/16/17  6:10 PM

## 2017-10-16 NOTE — PLAN OF CARE
Problem: Patient Care Overview (Adult)  Goal: Plan of Care Review  Outcome: Ongoing (interventions implemented as appropriate)    10/16/17 0417   Coping/Psychosocial Response Interventions   Plan Of Care Reviewed With patient   Patient Care Overview   Progress improving   Outcome Evaluation   Outcome Summary/Follow up Plan vss. for barium enema this am, bowel prep given. po narcotic given with good relief. to remain npo til test is done       Goal: Adult Individualization and Mutuality  Outcome: Ongoing (interventions implemented as appropriate)  Goal: Discharge Needs Assessment  Outcome: Ongoing (interventions implemented as appropriate)    Problem: Pain, Acute (Adult)  Goal: Acceptable Pain Control/Comfort Level  Outcome: Ongoing (interventions implemented as appropriate)

## 2017-10-16 NOTE — NURSING NOTE
Contacted by Dr. King at 3pm.  He had discussed pt with radiologist and Dr. Evangelista earlier today and plans for CT vs BE that was ordered was planned.  Prior to CT Dr. King would like to get surgery consult in light of c-scope findings and ongoing complaints of abdominal discomfort.  He asked that we consult surgical service of our choice but was going to place call to the surgery service to discuss pt with them.  CT vs BE vs other treatment plan per surgery pending their consult.  Dr. Evangelista updated after discussion with Dr. King.

## 2017-10-16 NOTE — PROGRESS NOTES
[facility]/GI Progress Note   Chief Complaint:  Abdominal pain    Subjective     Interval History: Still with intermittent RLQ discomfort, improved from admission but not resolved.  Currently NPO and did bowel prep last night for barium enema planned for today.  No n/v with bowel prep    History taken from: patient chart family    Review of Systems:    All systems were reviewed and negative except for:  Gastrointestinal: postitive for  pain    Objective     Vital Signs  Temp:  [97 °F (36.1 °C)-98.6 °F (37 °C)] 98.6 °F (37 °C)  Heart Rate:  [75-89] 79  Resp:  [16] 16  BP: (116-134)/(63-79) 121/67  Body mass index is 30.47 kg/(m^2).    Intake/Output Summary (Last 24 hours) at 10/16/17 1121  Last data filed at 10/16/17 0900   Gross per 24 hour   Intake             1090 ml   Output             1700 ml   Net             -610 ml     I/O this shift:  In: -   Out: 400 [Urine:400]    Physical Exam:   General: patient awake, alert and cooperative, resting in bed, no distress   Eyes: Normal lids and lashes, no scleral icterus   Neck: supple, normal ROM, no tracheal deviation   Skin: warm and dry, not jaundiced, abd scars noted   Cardiovascular: regular rhythm and rate, no murmurs auscultated   Pulm: clear to auscultation bilaterally, regular and unlabored   Abdomen: soft, RLQ tender, slighlty distended; normal bowel sounds   Rectal: deferred   Extremities: no rash or edema   Neurologic: Normal mood and behavior    All Medications Have Been Reviewed     Results Review:       Results from last 7 days  Lab Units 10/15/17  0539 10/14/17  0656 10/13/17  0547   WBC 10*3/mm3 6.40 6.59 6.26   HEMOGLOBIN g/dL 8.1* 9.0* 8.9*   HEMATOCRIT % 23.7* 26.2* 26.4*   PLATELETS 10*3/mm3 361 368 358         Results from last 7 days  Lab Units 10/15/17  0539 10/14/17  0656 10/13/17  0547  10/10/17  0521   SODIUM mmol/L 138 138 139  < > 139   POTASSIUM mmol/L 3.7 3.7 4.2  < > 4.4   CHLORIDE mmol/L 100 100 103  < > 104   CO2 mmol/L 25.7 24.7 24.2   "< > 24.9   BUN mg/dL 12 7 7  < > 15   CREATININE mg/dL 1.01* 0.92 1.05*  < > 1.19*   CALCIUM mg/dL 9.4 9.6 9.2  < > 9.2   BILIRUBIN mg/dL  --   --   --   --  0.4   ALK PHOS U/L  --   --   --   --  73   ALT (SGPT) U/L  --   --   --   --  <5   AST (SGOT) U/L  --   --   --   --  6   GLUCOSE mg/dL 90 86 83  < > 85   < > = values in this interval not displayed.      Results from last 7 days  Lab Units 10/09/17  1240   INR  1.12*       RADIOLOGY:    Imaging Results (last 72 hours)     Procedure Component Value Units Date/Time    XR Abdomen Flat & Upright [[id number]] Collected:  10/13/17 1251     Updated:  10/13/17 1257    Narrative:       XR ABDOMEN FLAT AND UPRIGHT-     INDICATIONS: Possible small bowel obstruction     TECHNIQUE: SUPINE AND UPRIGHT VIEWS OF THE ABDOMEN     COMPARISON: 10/11/2017     FINDINGS:      The bowel gas pattern is nonobstructive. No free air seen under the  diaphragm. No significant air-fluid levels on the upright view. Mild  colonic fecal retention is seen. Surgical clips are seen in the left  aspect of the abdomen. Follow-up can be obtained as indications persist.        Minimal likely atelectasis or infiltrate at the left lung base suggest  continued follow-up of the chest.          Impression:          As described.     This report was finalized on 10/13/2017 12:54 PM by Dr. Job Almonte MD.             Assessment/Plan     Patient Active Problem List   Diagnosis Code   • Left renal mass N28.89   • Generalized abdominal pain R10.84   • Status post nephrectomy Z90.5   • History of renal cell carcinoma Z85.528   • Drug-induced constipation K59.03   • Abnormal CT of the abdomen R93.5     Janna Cheek, APRN  10/16/17  11:21 AM    Still feels bloated and full.  Had a BM this morning.  Discussed findings of colonoscopy and area of the colon with tight angulation with \"bland\" appearance.  Plans for barium enema but spoke with Dr King and proceeding with CT for further evaluation " instead.  She is eating and drinking ok.    Exam:  Genl: alert, no distress, appears stated age  HEENT: normal external eyes, ears, and nose, no icterus  Resp: normal effort, no wheezing, on room air  Cards: regular rate and rhythm, no murmurs, no LE edema  Abd: soft, non distended, non tender to palpation    Impression  1. Abdominal pain, bloating, early satiety: intermittent, not clearly related to the abnormal CT findings.  Her description is more of a functional/dysbiosis issue  2. Abnormal CT: Thickening noted in the region of the postoperative left nephrectomy bed.  Tight angulation identified on her colonoscopy at ~45cm, ? The same area  3. Anemia: elevated ferritin suggests anemia of chronic disease  4. Constipation: she seems to be having adequate BMs    Plan  -agree with repeating with CT abdomen, can decide about pursuing barium enema based on results (goal to identify her colonic anatomy following colonoscopy)  -increase mobilization  -start probiotic  -start simethicone  -diet as tolerated    Sima Evangelista MD  [facility]

## 2017-10-17 ENCOUNTER — APPOINTMENT (OUTPATIENT)
Dept: ULTRASOUND IMAGING | Facility: HOSPITAL | Age: 57
End: 2017-10-17
Attending: SURGERY

## 2017-10-17 ENCOUNTER — APPOINTMENT (OUTPATIENT)
Dept: GENERAL RADIOLOGY | Facility: HOSPITAL | Age: 57
End: 2017-10-17

## 2017-10-17 PROCEDURE — 99232 SBSQ HOSP IP/OBS MODERATE 35: CPT | Performed by: INTERNAL MEDICINE

## 2017-10-17 PROCEDURE — 25010000002 ONDANSETRON PER 1 MG: Performed by: INTERNAL MEDICINE

## 2017-10-17 PROCEDURE — 25010000002 HEPARIN (PORCINE) PER 1000 UNITS: Performed by: INTERNAL MEDICINE

## 2017-10-17 PROCEDURE — 76942 ECHO GUIDE FOR BIOPSY: CPT

## 2017-10-17 PROCEDURE — 0W9G3ZZ DRAINAGE OF PERITONEAL CAVITY, PERCUTANEOUS APPROACH: ICD-10-PCS | Performed by: HOSPITALIST

## 2017-10-17 PROCEDURE — 74270 X-RAY XM COLON 1CNTRST STD: CPT

## 2017-10-17 RX ORDER — SODIUM CHLORIDE 0.9 % (FLUSH) 0.9 %
1-10 SYRINGE (ML) INJECTION AS NEEDED
Status: DISCONTINUED | OUTPATIENT
Start: 2017-10-17 | End: 2017-10-19 | Stop reason: HOSPADM

## 2017-10-17 RX ADMIN — ONDANSETRON 4 MG: 2 INJECTION INTRAMUSCULAR; INTRAVENOUS at 21:04

## 2017-10-17 RX ADMIN — POLYETHYLENE GLYCOL 3350 17 G: 17 POWDER, FOR SOLUTION ORAL at 08:46

## 2017-10-17 RX ADMIN — HEPARIN SODIUM 5000 UNITS: 5000 INJECTION, SOLUTION INTRAVENOUS; SUBCUTANEOUS at 21:04

## 2017-10-17 RX ADMIN — POLYETHYLENE GLYCOL 3350 17 G: 17 POWDER, FOR SOLUTION ORAL at 17:33

## 2017-10-17 RX ADMIN — Medication 1 CAPSULE: at 08:48

## 2017-10-17 RX ADMIN — HYDROCODONE BITARTRATE AND ACETAMINOPHEN 1 TABLET: 7.5; 325 TABLET ORAL at 19:52

## 2017-10-17 RX ADMIN — HEPARIN SODIUM 5000 UNITS: 5000 INJECTION, SOLUTION INTRAVENOUS; SUBCUTANEOUS at 08:46

## 2017-10-17 RX ADMIN — SIMETHICONE CHEW TAB 80 MG 80 MG: 80 TABLET ORAL at 17:33

## 2017-10-17 RX ADMIN — SIMETHICONE CHEW TAB 80 MG 80 MG: 80 TABLET ORAL at 08:48

## 2017-10-17 RX ADMIN — HYDROCODONE BITARTRATE AND ACETAMINOPHEN 1 TABLET: 7.5; 325 TABLET ORAL at 15:44

## 2017-10-17 RX ADMIN — HYDROCODONE BITARTRATE AND ACETAMINOPHEN 1 TABLET: 7.5; 325 TABLET ORAL at 08:46

## 2017-10-17 NOTE — PLAN OF CARE
Problem: Patient Care Overview (Adult)  Goal: Plan of Care Review  Outcome: Ongoing (interventions implemented as appropriate)    10/17/17 0453   Coping/Psychosocial Response Interventions   Plan Of Care Reviewed With patient   Patient Care Overview   Progress improving   Outcome Evaluation   Outcome Summary/Follow up Plan VSS. C/o Intermittent abdominal pain left radiating to the back. Clear liquids tolerated. Slept well. Voided.       Goal: Adult Individualization and Mutuality  Outcome: Ongoing (interventions implemented as appropriate)  Goal: Discharge Needs Assessment  Outcome: Ongoing (interventions implemented as appropriate)    Problem: Pain, Acute (Adult)  Goal: Acceptable Pain Control/Comfort Level  Outcome: Ongoing (interventions implemented as appropriate)

## 2017-10-17 NOTE — PLAN OF CARE
Problem: Patient Care Overview (Adult)  Goal: Plan of Care Review  Outcome: Ongoing (interventions implemented as appropriate)    10/17/17 1814   Coping/Psychosocial Response Interventions   Plan Of Care Reviewed With patient   Patient Care Overview   Progress progress toward functional goals as expected   Outcome Evaluation   Outcome Summary/Follow up Plan vss.          Problem: Pain, Acute (Adult)  Goal: Acceptable Pain Control/Comfort Level  Outcome: Ongoing (interventions implemented as appropriate)    10/17/17 1804   Pain, Acute (Adult)   Acceptable Pain Control/Comfort Level making progress toward outcome

## 2017-10-17 NOTE — PROGRESS NOTES
[facility] [facility]     LOS: 8 days     Subjective:  No cp  No soa    Eating ok    abd pain and bloating is slightly better, eating a regular diet    Objective:    Vital Signs:  Temp:  [97.2 °F (36.2 °C)-99.4 °F (37.4 °C)] 97.2 °F (36.2 °C)  Heart Rate:  [70-92] 76  Resp:  [16-18] 18  BP: (103-131)/(61-77) 129/75    General Appearance: no acute distress, appears comfortable  Heart: regular rate and rhythm  Lungs: clear to auscultation bilaterally, respirations unlabored, good air entry  Abdomen: soft, non-tender, no guarding, no rebound, non-distended  Extremities: no edema, no cyanosis  Neurology: speech normal, CN grossly normal  Psychiatric: normal mood and affect    Results Review:    Glucose   Date Value Ref Range Status   10/15/2017 90 65 - 99 mg/dL Final       Results from last 7 days  Lab Units 10/15/17  0539   WBC 10*3/mm3 6.40   HEMOGLOBIN g/dL 8.1*   HEMATOCRIT % 23.7*   PLATELETS 10*3/mm3 361       Results from last 7 days  Lab Units 10/15/17  0539   SODIUM mmol/L 138   POTASSIUM mmol/L 3.7   CHLORIDE mmol/L 100   CO2 mmol/L 25.7   BUN mg/dL 12   CREATININE mg/dL 1.01*   CALCIUM mg/dL 9.4   GLUCOSE mg/dL 90                          I have reviewed daily medications and changes in CPOE    Scheduled meds    heparin (porcine) 5,000 Units Subcutaneous Q12H   lactobacillus acidophilus 1 capsule Oral Daily   polyethylene glycol 17 g Oral BID   simethicone 80 mg Oral 4x Daily AC & at Bedtime          PRN meds  HYDROcodone-acetaminophen  •  lactulose  •  Morphine  •  ondansetron  •  sennosides-docusate sodium  •  sodium chloride  •  sodium chloride  •  sodium chloride      Principal Problem:    Generalized abdominal pain  Active Problems:    Status post nephrectomy    History of renal cell carcinoma    Drug-induced constipation    Abnormal CT of the abdomen        Assessment/Plan:  CT of the abd and pelvis done and reviewed    Barium enema complete and d/w Dr Mann    Eating a regular  diet    D/w Dr Mann, Paulino Cheek and nurse    >35 minutes spent, > 1/2 time spent counseling and coordination of care     Armando iKng MD  10/17/17  4:51 PM

## 2017-10-17 NOTE — PROGRESS NOTES
[facility]  Inpatient Progress Note    Reason for Follow Up:  Abdominal pain    Subjective     Interval History:   No abdominal pain, nausea or vomiting. She is having liquid, nonbloody BM's while being prepped for barium enema. The patient states that Dr. Oconnor plans for patient to have a barium enema and then drainage of intraperitoneal fluid.    Current Facility-Administered Medications:   •  heparin (porcine) 5000 UNIT/ML injection 5,000 Units, 5,000 Units, Subcutaneous, Q12H, Vilma Ann MD, 5,000 Units at 10/17/17 0846  •  HYDROcodone-acetaminophen (NORCO) 7.5-325 MG per tablet 1 tablet, 1 tablet, Oral, Q4H PRN, Vilma Ann MD, 1 tablet at 10/17/17 0846  •  lactated ringers infusion, 30 mL/hr, Intravenous, Continuous, Peg Arreguin MD, Last Rate: 30 mL/hr at 10/14/17 1332, 30 mL/hr at 10/14/17 1332  •  lactobacillus acidophilus (RISAQUAD) capsule 1 capsule, 1 capsule, Oral, Daily, Sima Evangelista MD, 1 capsule at 10/17/17 0848  •  lactulose (CHRONULAC) 10 GM/15ML solution 20 g, 20 g, Oral, Daily PRN, Peg Arreguin MD  •  morphine injection 2 mg, 2 mg, Intravenous, Q4H PRN, Vilma Ann MD, 2 mg at 10/14/17 0801  •  ondansetron (ZOFRAN) injection 4 mg, 4 mg, Intravenous, Q6H PRN, Vilma Ann MD, 4 mg at 10/14/17 0801  •  polyethylene glycol (MIRALAX) powder 17 g, 17 g, Oral, BID, Oswaldo Rico MD, 17 g at 10/17/17 0846  •  sennosides-docusate sodium (SENOKOT-S) 8.6-50 MG tablet 2 tablet, 2 tablet, Oral, BID PRN, Peg Arreguin MD  •  simethicone (MYLICON) chewable tablet 80 mg, 80 mg, Oral, 4x Daily AC & at Bedtime, Sima Evangelista MD, 80 mg at 10/17/17 0848  •  sodium chloride 0.9 % flush 1-10 mL, 1-10 mL, Intravenous, PRN, Vilma Ann MD  •  sodium chloride 0.9 % flush 10 mL, 10 mL, Intravenous, PRN, Anurag Green MD  Review of Systems:    The following systems were reviewed and negative;  respiratory, cardiovascular, musculoskeletal and neurological    Objective      Vital Signs  Temp:  [97.1 °F (36.2 °C)-99.4 °F (37.4 °C)] 99.4 °F (37.4 °C)  Heart Rate:  [70-92] 92  Resp:  [16-18] 18  BP: (103-131)/(61-77) 122/77  Body mass index is 30.47 kg/(m^2).    Intake/Output Summary (Last 24 hours) at 10/17/17 0952  Last data filed at 10/17/17 0900   Gross per 24 hour   Intake              760 ml   Output             1600 ml   Net             -840 ml     I/O this shift:  In: 120 [P.O.:120]  Out: 300 [Urine:300]     Physical Exam:   General: patient awake, alert and cooperative   Eyes: Normal lids and lashes, no scleral icterus   Neck: supple, normal ROM   Skin: warm and dry, not jaundiced   Cardiovascular: regular rhythm and rate, no murmurs auscultated   Pulm: clear to auscultation bilaterally, regular and unlabored   Abdomen: soft, nontender, mildly distended; normal bowel sounds   Rectal: deferred   Extremities: no rash or edema   Psychiatric: Normal mood and behavior; memory intact     Results Review:     I reviewed the patient's new clinical results.      Results from last 7 days  Lab Units 10/15/17  0539 10/14/17  0656 10/13/17  0547   WBC 10*3/mm3 6.40 6.59 6.26   HEMOGLOBIN g/dL 8.1* 9.0* 8.9*   HEMATOCRIT % 23.7* 26.2* 26.4*   PLATELETS 10*3/mm3 361 368 358       Results from last 7 days  Lab Units 10/15/17  0539 10/14/17  0656 10/13/17  0547   SODIUM mmol/L 138 138 139   POTASSIUM mmol/L 3.7 3.7 4.2   CHLORIDE mmol/L 100 100 103   CO2 mmol/L 25.7 24.7 24.2   BUN mg/dL 12 7 7   CREATININE mg/dL 1.01* 0.92 1.05*   CALCIUM mg/dL 9.4 9.6 9.2   GLUCOSE mg/dL 90 86 83           Lab Results  Lab Value Date/Time   LIPASE 12 (L) 10/09/2017 1102       Radiology:  CT Abdomen Pelvis Without Contrast   Final Result   1. There is a short segment of proximal/mid descending colon which   appears narrowed/contracted and tethered at the surgical bed to the left   quadratus lumborum and psoas muscles. The colon proximally is mildly   distended.   2. There is no significant change in the  volume of free fluid within the   abdomen or pelvis. Some of the free fluid is in the nondependent aspects   of the abdomen and pelvis which is concerning.   3. Interval increase in small pleural effusions and significant increase   in the atelectasis at the lower lobes with probable subsegmental   collapse.       This report was finalized on 10/16/2017 4:46 PM by Dr. Mary Eduardo MD.          XR Abdomen Flat & Upright   Final Result      XR Abdomen Flat & Upright   Final Result       As described.       This report was finalized on 10/13/2017 12:54 PM by Dr. Job Almonte MD.          XR Abdomen KUB   Final Result   No acute process.       This report was finalized on 10/12/2017 10:43 AM by Dr. Lenny Fletcher MD.          XR Abdomen KUB   Final Result      CT Abdomen Pelvis Without Contrast   Final Result   1. Status post left nephrectomy. The midleft colon demonstrates wall   thickening within the operative bed. This finding is likely reactive due   to recent surgery although colitis remains on the differential. There is   moderate intra-abdominal fluid that demonstrates relatively low   attenuation values ranging from 15 to 25 Hounsfield units.    2. Bilateral small layering effusions with bibasilar atelectasis within   the lower lobes.   3. Small hypodense liver lesion. The imaging findings are nonspecific   and correlation with remote imaging would be most helpful.       These findings were discussed with Dr. Green by telephone at 12:28 PM   on 10/09/2017.       Radiation dose reduction techniques were utilized, including automated   exposure control and exposure modulation based on body size.       This report was finalized on 10/10/2017 11:42 AM by Dr. Charlene Ferreira MD.          FL Barium Enema    (Results Pending)   CT Guided Abscess Drain Peritoneal    (Results Pending)   US Paracentesis    (Results Pending)       Assessment/Plan     Patient Active Problem List   Diagnosis   • Left renal  mass   • Generalized abdominal pain   • Status post nephrectomy   • History of renal cell carcinoma   • Drug-induced constipation   • Abnormal CT of the abdomen       I discussed the patients findings and my recommendations with patient, family and nursing staff.    Assessment/Recommendations:  1) Abdominal pain has resolved.  2) Abnormal CT: Thickening noted in the region of the postoperative left nephrectomy bed.  Tight angulation identified on her colonoscopy at ~45cm, ? The same area. Barium enema is to be done today.  3) Fluid in peritoneum. She will have drainage of this fluid by [facility] per Dr. Oconnor.    Laron Tiwari MD

## 2017-10-18 PROCEDURE — 99231 SBSQ HOSP IP/OBS SF/LOW 25: CPT | Performed by: INTERNAL MEDICINE

## 2017-10-18 PROCEDURE — 25010000002 HEPARIN (PORCINE) PER 1000 UNITS: Performed by: INTERNAL MEDICINE

## 2017-10-18 PROCEDURE — 99254 IP/OBS CNSLTJ NEW/EST MOD 60: CPT | Performed by: SURGERY

## 2017-10-18 RX ORDER — LACTULOSE 10 G/15ML
20 SOLUTION ORAL 2 TIMES DAILY
Status: DISCONTINUED | OUTPATIENT
Start: 2017-10-18 | End: 2017-10-19 | Stop reason: HOSPADM

## 2017-10-18 RX ADMIN — POLYETHYLENE GLYCOL 3350 17 G: 17 POWDER, FOR SOLUTION ORAL at 08:58

## 2017-10-18 RX ADMIN — SIMETHICONE CHEW TAB 80 MG 80 MG: 80 TABLET ORAL at 18:47

## 2017-10-18 RX ADMIN — HEPARIN SODIUM 5000 UNITS: 5000 INJECTION, SOLUTION INTRAVENOUS; SUBCUTANEOUS at 21:13

## 2017-10-18 RX ADMIN — HYDROCODONE BITARTRATE AND ACETAMINOPHEN 1 TABLET: 7.5; 325 TABLET ORAL at 21:10

## 2017-10-18 RX ADMIN — SIMETHICONE CHEW TAB 80 MG 80 MG: 80 TABLET ORAL at 21:13

## 2017-10-18 RX ADMIN — SIMETHICONE CHEW TAB 80 MG 80 MG: 80 TABLET ORAL at 12:04

## 2017-10-18 RX ADMIN — HYDROCODONE BITARTRATE AND ACETAMINOPHEN 1 TABLET: 7.5; 325 TABLET ORAL at 15:11

## 2017-10-18 RX ADMIN — Medication 1 CAPSULE: at 08:58

## 2017-10-18 RX ADMIN — POLYETHYLENE GLYCOL 3350 17 G: 17 POWDER, FOR SOLUTION ORAL at 18:47

## 2017-10-18 RX ADMIN — SIMETHICONE CHEW TAB 80 MG 80 MG: 80 TABLET ORAL at 08:58

## 2017-10-18 RX ADMIN — LACTULOSE 20 G: 20 SOLUTION ORAL at 15:07

## 2017-10-18 RX ADMIN — HEPARIN SODIUM 5000 UNITS: 5000 INJECTION, SOLUTION INTRAVENOUS; SUBCUTANEOUS at 08:58

## 2017-10-18 RX ADMIN — HYDROCODONE BITARTRATE AND ACETAMINOPHEN 1 TABLET: 7.5; 325 TABLET ORAL at 09:08

## 2017-10-18 NOTE — PLAN OF CARE
Problem: Patient Care Overview (Adult)  Goal: Plan of Care Review  Outcome: Ongoing (interventions implemented as appropriate)    10/18/17 0448   Coping/Psychosocial Response Interventions   Plan Of Care Reviewed With patient   Patient Care Overview   Progress improving   Outcome Evaluation   Outcome Summary/Follow up Plan VSS. Medicated for pain and nausea once this shift. Ambulated around the smith several times. Voided freely. Had BM.        Goal: Adult Individualization and Mutuality  Outcome: Ongoing (interventions implemented as appropriate)  Goal: Discharge Needs Assessment  Outcome: Ongoing (interventions implemented as appropriate)

## 2017-10-18 NOTE — PLAN OF CARE
Problem: Patient Care Overview (Adult)  Goal: Plan of Care Review    10/18/17 2359   Coping/Psychosocial Response Interventions   Plan Of Care Reviewed With patient   Patient Care Overview   Progress improving   Outcome Evaluation   Outcome Summary/Follow up Plan VSS. Amb often in [place]. Med for pain x2. 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        Goal: Adult Individualization and Mutuality  Outcome: Ongoing (interventions implemented as appropriate)  Goal: Discharge Needs Assessment  Outcome: Ongoing (interventions implemented as appropriate)    Problem: Pain, Acute (Adult)  Goal: Acceptable Pain Control/Comfort Level  Outcome: Ongoing (interventions implemented as appropriate)

## 2017-10-18 NOTE — CONSULTS
"Adult Nutrition  Assessment/PES    Patient Name:  Kimi Thomas  YOB: 1960  MRN: [id number]  Admit Date:  10/9/2017    Assessment Date:  10/18/2017    Comments:  Screen for LOS. Will follow for increase po intake, and honor food pref's as tolerated.          Reason for Assessment       10/18/17 1122    Reason for Assessment    Reason For Assessment/Visit length of stay    Diagnosis Diagnosis    Gastrointestinal --   abd pain    Oncology Rectal cancer   nephrectomy 8/17                Anthropometrics       10/18/17 1123    Anthropometrics    Height 152.4 cm (60\")    RD Documented Current Weight  70.8 kg (156 lb)    Ideal Body Weight (IBW)    Ideal Body Weight (IBW), Female 46.26    Body Mass Index (BMI)    BMI Grade 30 - 34.9- obesity - grade I            Labs/Tests/Procedures/Meds       10/18/17 1124    Labs/Tests/Procedures/Meds    Diagnostic Test/Procedure Review reviewed    Labs/Tests Review Reviewed    Medication Review Reviewed, pertinent;Laxative   lactobacillus acidophilus    Significant Vitals reviewed            Physical Findings       10/18/17 1125    Physical Findings/Assessment    Additional Documentation Physical Appearance (Group)    Physical Appearance    Overall Physical Appearance obese    Gastrointestinal abdominal tenderness;abdominal distention    Skin --   intact            Estimated/Assessed Needs       10/18/17 1126    Calculation Measurements    Weight Used For Calculations 70.8 kg (156 lb 1.4 oz)    Estimated/Assessed Energy Needs    Energy Need Method Kcal/kg    kcal/kg 30    30 Kcal/Kg (kcal) 2124    Estimated/Assessed Protein Needs    Weight Used for Protein Calculation 70.8 kg (156 lb 1.4 oz)    Protein (gm/kg) 1.0    1.0 Gm Protein (gm) 70.8    Estimated/Assessed Fluid Needs    Fluid Need Method RDA method    RDA Method (mL)  2125            Nutrition Prescription Ordered       10/18/17 1126    Nutrition Prescription PO    Current PO Diet Regular    Common Modifiers GI " Soft/[place]            Evaluation of Received Nutrient/Fluid Intake       10/18/17 1127    PO Evaluation    % PO Intake 25%            Problem/Interventions:        Problem 1       10/18/17 1127    Nutrition Diagnoses Problem 1    Problem 1 Inadequate Intake/Infusion    Inadequate Intake Type Oral    Gastrointestinal --   abd pain                    Intervention Goal       10/18/17 1130    Intervention Goal    General Maintain nutrition    PO Tolerate PO;Increase intake    Weight No significant weight loss            Nutrition Intervention       10/18/17 1130    Nutrition Intervention    RD/Tech Action Care plan reviewd;Follow Tx progress;Interview for preference              Education/Evaluation       10/18/17 1130    Education    Education Will Instruct as appropriate    Monitor/Evaluation    Monitor Per protocol        Electronically signed by:  Nellie Terrell RD  10/18/17 11:31 AM

## 2017-10-18 NOTE — PROGRESS NOTES
[facility]     LOS: 9 days     Subjective:  No cp  No soa    Eating ok    abd pain and bloating is slightly better, eating a regular diet    Objective:    Vital Signs:  Temp:  [96.8 °F (36 °C)-98.6 °F (37 °C)] 96.8 °F (36 °C)  Heart Rate:  [76-88] 88  Resp:  [18] 18  BP: (103-129)/(55-75) 114/68    General Appearance: no acute distress, appears comfortable  Heart: regular rate and rhythm  Lungs: clear to auscultation bilaterally, respirations unlabored, good air entry  Abdomen: soft, minimally-tender, no guarding, no rebound, non-distended  Extremities: no edema, no cyanosis  Neurology: speech normal, CN grossly normal  Psychiatric: normal mood and affect    Results Review:    No results found for: GLUCOSE    Results from last 7 days  Lab Units 10/15/17  0539   WBC 10*3/mm3 6.40   HEMOGLOBIN g/dL 8.1*   HEMATOCRIT % 23.7*   PLATELETS 10*3/mm3 361       Results from last 7 days  Lab Units 10/15/17  0539   SODIUM mmol/L 138   POTASSIUM mmol/L 3.7   CHLORIDE mmol/L 100   CO2 mmol/L 25.7   BUN mg/dL 12   CREATININE mg/dL 1.01*   CALCIUM mg/dL 9.4   GLUCOSE mg/dL 90                          I have reviewed daily medications and changes in CPOE    Scheduled meds    heparin (porcine) 5,000 Units Subcutaneous Q12H   lactobacillus acidophilus 1 capsule Oral Daily   lactulose 20 g Oral BID   polyethylene glycol 17 g Oral BID   simethicone 80 mg Oral 4x Daily AC & at Bedtime          PRN meds  HYDROcodone-acetaminophen  •  Morphine  •  ondansetron  •  sennosides-docusate sodium  •  sodium chloride  •  sodium chloride  •  sodium chloride      Principal Problem:    Generalized abdominal pain  Active Problems:    Status post nephrectomy    History of renal cell carcinoma    Drug-induced constipation    Abnormal CT of the abdomen        Assessment/Plan:  Repeat xray in the am    Possible d/c tomorrow      D/w nurse        Armando King MD  10/18/17  2:37 PM

## 2017-10-18 NOTE — PROGRESS NOTES
[facility]  Inpatient Progress Note    Reason for Follow Up:  Abdominal pain    Subjective     Interval History:   No abdominal pain.  Pt has one BM after BE yesterday but none since.      Current Facility-Administered Medications:   •  heparin (porcine) 5000 UNIT/ML injection 5,000 Units, 5,000 Units, Subcutaneous, Q12H, Vilma Ann MD, 5,000 Units at 10/18/17 0858  •  HYDROcodone-acetaminophen (NORCO) 7.5-325 MG per tablet 1 tablet, 1 tablet, Oral, Q4H PRN, Vilma Ann MD, 1 tablet at 10/18/17 0908  •  lactobacillus acidophilus (RISAQUAD) capsule 1 capsule, 1 capsule, Oral, Daily, Sima Evangelista MD, 1 capsule at 10/18/17 0858  •  lactulose (CHRONULAC) 10 GM/15ML solution 20 g, 20 g, Oral, Daily PRN, Peg Arreguin MD  •  morphine injection 2 mg, 2 mg, Intravenous, Q4H PRN, Vilma Ann MD, 2 mg at 10/14/17 0801  •  ondansetron (ZOFRAN) injection 4 mg, 4 mg, Intravenous, Q6H PRN, Vilma Ann MD, 4 mg at 10/17/17 2104  •  polyethylene glycol (MIRALAX) powder 17 g, 17 g, Oral, BID, Oswaldo Rico MD, 17 g at 10/18/17 0858  •  sennosides-docusate sodium (SENOKOT-S) 8.6-50 MG tablet 2 tablet, 2 tablet, Oral, BID PRN, Peg Arreguin MD  •  simethicone (MYLICON) chewable tablet 80 mg, 80 mg, Oral, 4x Daily AC & at Bedtime, Sima Evangelista MD, 80 mg at 10/18/17 0858  •  sodium chloride 0.9 % flush 1-10 mL, 1-10 mL, Intravenous, PRN, Vilma Ann MD  •  sodium chloride 0.9 % flush 1-10 mL, 1-10 mL, Intravenous, PRN, Armando King MD  •  sodium chloride 0.9 % flush 10 mL, 10 mL, Intravenous, PRN, Anurag Green MD     Review of Systems:    The following systems were reviewed and negative;  respiratory, cardiovascular, musculoskeletal and neurological    Objective     Vital Signs  Temp:  [97 °F (36.1 °C)-98.6 °F (37 °C)] 98.3 °F (36.8 °C)  Heart Rate:  [74-84] 84  Resp:  [16-18] 18  BP: (103-131)/(55-75) 107/55  Body mass index is 30.47 kg/(m^2).    Intake/Output Summary (Last 24 hours)  at 10/18/17 1029  Last data filed at 10/18/17 0841   Gross per 24 hour   Intake             1160 ml   Output             1650 ml   Net             -490 ml     I/O this shift:  In: 210 [P.O.:210]  Out: -      Physical Exam:   General: patient awake, alert and cooperative   Cardiovascular: regular rhythm and rate, no murmurs auscultated   Pulm: clear to auscultation bilaterally, regular and unlabored   Abdomen: soft, mild distention but no pain on palpation, normal bowel sounds   Psychiatric: Normal mood and behavior; memory intact     Results Review:     I reviewed the patient's new clinical results.      Results from last 7 days  Lab Units 10/15/17  0539 10/14/17  0656 10/13/17  0547   WBC 10*3/mm3 6.40 6.59 6.26   HEMOGLOBIN g/dL 8.1* 9.0* 8.9*   HEMATOCRIT % 23.7* 26.2* 26.4*   PLATELETS 10*3/mm3 361 368 358       Results from last 7 days  Lab Units 10/15/17  0539 10/14/17  0656 10/13/17  0547   SODIUM mmol/L 138 138 139   POTASSIUM mmol/L 3.7 3.7 4.2   CHLORIDE mmol/L 100 100 103   CO2 mmol/L 25.7 24.7 24.2   BUN mg/dL 12 7 7   CREATININE mg/dL 1.01* 0.92 1.05*   CALCIUM mg/dL 9.4 9.6 9.2   GLUCOSE mg/dL 90 86 83           Lab Results  Lab Value Date/Time   LIPASE 12 (L) 10/09/2017 1102       Radiology:  US Paracentesis   Final Result      FL Barium Enema   Final Result      CT Abdomen Pelvis Without Contrast   Final Result   1. There is a short segment of proximal/mid descending colon which   appears narrowed/contracted and tethered at the surgical bed to the left   quadratus lumborum and psoas muscles. The colon proximally is mildly   distended.   2. There is no significant change in the volume of free fluid within the   abdomen or pelvis. Some of the free fluid is in the nondependent aspects   of the abdomen and pelvis which is concerning.   3. Interval increase in small pleural effusions and significant increase   in the atelectasis at the lower lobes with probable subsegmental   collapse.       This report  was finalized on 10/16/2017 4:46 PM by Dr. Mary Eduardo MD.          XR Abdomen Flat & Upright   Final Result      XR Abdomen Flat & Upright   Final Result       As described.       This report was finalized on 10/13/2017 12:54 PM by Dr. Job Almonte MD.          XR Abdomen KUB   Final Result   No acute process.       This report was finalized on 10/12/2017 10:43 AM by Dr. Lenny Fletcher MD.          XR Abdomen KUB   Final Result      CT Abdomen Pelvis Without Contrast   Final Result   1. Status post left nephrectomy. The midleft colon demonstrates wall   thickening within the operative bed. This finding is likely reactive due   to recent surgery although colitis remains on the differential. There is   moderate intra-abdominal fluid that demonstrates relatively low   attenuation values ranging from 15 to 25 Hounsfield units.    2. Bilateral small layering effusions with bibasilar atelectasis within   the lower lobes.   3. Small hypodense liver lesion. The imaging findings are nonspecific   and correlation with remote imaging would be most helpful.       These findings were discussed with Dr. Green by telephone at 12:28 PM   on 10/09/2017.       Radiation dose reduction techniques were utilized, including automated   exposure control and exposure modulation based on body size.       This report was finalized on 10/10/2017 11:42 AM by Dr. Charlene Ferreira MD.          CT Guided Abscess Drain Peritoneal    (Results Pending)       Assessment/Plan     Patient Active Problem List   Diagnosis   • Left renal mass   • Generalized abdominal pain   • Status post nephrectomy   • History of renal cell carcinoma   • Drug-induced constipation   • Abnormal CT of the abdomen       Assessment/Recommendations:  1) Abdominal pain- resolved.  2) Abnormal CT and barium enema - tenting and luminal narrowing of colon in proximal descending colon; this area corresponds to abnormality on recent colonoscopy.  Continue bowel      regiment to facilitate passage of barium.  Appreciate general surgery input.      No additional GI recommendations at this time.  Please call if we can be of further assistance.     I discussed the patients findings and my recommendations with patient and family.        Keith Eid M.D.  [facility]  [address] [place] [address]  Office: [phone]

## 2017-10-18 NOTE — PROGRESS NOTES
Urology Progress Note    Patient reports feeling slightly better. Less pain and bloating. Tolerating regular diet. Has BM but only with bowel regimen.    AVSS  Abd soft, mild/moderate distension, mildly tender.    Barium enema: abnormal angulation of the left colon at the splenic flexure.    Plan:  - Dr. Oconnor to see today to discuss surgical options

## 2017-10-18 NOTE — CONSULTS
SUMMARY (A/P):    57-year-old lady with colonic stricture at and distal to splenic flexure near the site of surgical bed from nephrectomy done in August.  Etiology is indeterminate, differential includes ischemic stricture and scarring, malignancy felt less likely.  She is not terribly symptomatic and I would like to see if we could manage this without surgical intervention.  Along those lines I would recommend continuing twice a day MiraLAX, monitoring her clinical course, repeating KUB in a.m., and repeating hemoglobin in a.m. to make sure this is not dropping further.      CC:  Abdominal pain    HPI:  57-year-old lady whom I've been consulted to see by Dr. Arreguin regarding abdominal pain.  She underwent laparoscopic left nephrectomy on 8/28/2017.  She was admitted on 10/9/2017 complaining of a 10 day history of mild to moderate left-sided abdominal pain radiating to the back associated with diarrhea and ultimately leading to emergency room visit and admission.  Her pain currently is mild.    PHYSICAL EXAM:   Constitutional: Well-developed well-nourished, no acute distress  Vital signs: /55, heart rate 84, respirations 18, temp 98.3, weight 156 pounds, BMI 30.5, height 60 inches   Eyes: Conjunctiva normal, sclera nonicteric  ENMT: Hearing grossly normal, oral mucosa moist  Neck: Supple, no palpable mass, normal thyroid, trachea midline  Respiratory: Clear to auscultation, normal inspiratory effort  Cardiovascular: Regular rate, no murmur, no carotid bruit, no peripheral edema, no jugular venous distention  Gastrointestinal: Soft, minimal diffuse tenderness, no palpable mass, no hepatosplenomegaly, negative for hernia, bowel sounds normal  Lymphatics (palpable nodes):  cervical-negative, axillary-negative  Skin:  Warm, dry, no rash on visualized skin surfaces  Musculoskeletal: Symmetric strength, normal gait  Psychiatric: Alert and oriented ×3, normal affect     ALLERGIES:    -Clindamycin  -Oxycodone  -Amoxicillin    MEDICATIONS:   None    PMH:    Oncocytoma left kidney    PSH:    Laparoscopic left nephrectomy 8/28/2017  Breast lumpectomy  Hysterectomy    FAMILY HISTORY:    Negative for colorectal cancer    SOCIAL HISTORY:   Denies tobacco use  Denies alcohol use    ROS:  No chest pain or shortness of air.  All other systems reviewed and negative other than presenting complaints.    RADIOLOGY/ENDOSCOPY:    -CT abdomen pelvis 10/9/2017: Mid left colon demonstrates wall thickening within the operative bed, likely reactive.  Moderate intra-abdominal fluid.  Reviewed images, concur.  -CT abdomen pelvis 10/16/2017: Short segment of proximal mid descending colon which appears narrowed contracted and tethered at the surgical bed, colon proximally is mildly distended.  No significant change in the moderate amount of free fluid.  Reviewed images, concur.  -Barium enema 10/17/2017: Angular tethering of a moderately narrowed caliber segment of colon at and immediately distal to the splenic flexure measuring 12-13 cm in length.  Reviewed images, concur    LABS:    -BUN 12, creatinine 1.01, remaining BMP from 10/15/2017 normal  - WBC 6.4, hemoglobin 8.1, platelets 361 from 10/15/2017    RACHELLE VALENTIN M.D.

## 2017-10-19 ENCOUNTER — APPOINTMENT (OUTPATIENT)
Dept: GENERAL RADIOLOGY | Facility: HOSPITAL | Age: 57
End: 2017-10-19
Attending: SURGERY

## 2017-10-19 VITALS
DIASTOLIC BLOOD PRESSURE: 69 MMHG | SYSTOLIC BLOOD PRESSURE: 116 MMHG | WEIGHT: 156 LBS | HEIGHT: 60 IN | RESPIRATION RATE: 16 BRPM | OXYGEN SATURATION: 98 % | TEMPERATURE: 97.7 F | HEART RATE: 72 BPM | BODY MASS INDEX: 30.63 KG/M2

## 2017-10-19 LAB
DEPRECATED RDW RBC AUTO: 48.4 FL (ref 37–54)
ERYTHROCYTE [DISTWIDTH] IN BLOOD BY AUTOMATED COUNT: 21.2 % (ref 11.7–13)
HCT VFR BLD AUTO: 24.9 % (ref 35.6–45.5)
HGB BLD-MCNC: 8.5 G/DL (ref 11.9–15.5)
MCH RBC QN AUTO: 21.3 PG (ref 26.9–32)
MCHC RBC AUTO-ENTMCNC: 34.1 G/DL (ref 32.4–36.3)
MCV RBC AUTO: 62.4 FL (ref 80.5–98.2)
PLATELET # BLD AUTO: 379 10*3/MM3 (ref 140–500)
PMV BLD AUTO: 9.2 FL (ref 6–12)
RBC # BLD AUTO: 3.99 10*6/MM3 (ref 3.9–5.2)
WBC NRBC COR # BLD: 6.27 10*3/MM3 (ref 4.5–10.7)

## 2017-10-19 PROCEDURE — 99231 SBSQ HOSP IP/OBS SF/LOW 25: CPT | Performed by: SURGERY

## 2017-10-19 PROCEDURE — 74000 HC ABDOMEN KUB: CPT

## 2017-10-19 PROCEDURE — 85027 COMPLETE CBC AUTOMATED: CPT | Performed by: SURGERY

## 2017-10-19 PROCEDURE — 25010000002 HEPARIN (PORCINE) PER 1000 UNITS: Performed by: INTERNAL MEDICINE

## 2017-10-19 RX ORDER — POLYETHYLENE GLYCOL 3350 17 G/17G
17 POWDER, FOR SOLUTION ORAL 2 TIMES DAILY
Qty: 1020 G | Refills: 0 | Status: SHIPPED | OUTPATIENT
Start: 2017-10-19 | End: 2017-11-02

## 2017-10-19 RX ORDER — POLYETHYLENE GLYCOL 3350 17 G/17G
17 POWDER, FOR SOLUTION ORAL 2 TIMES DAILY
Start: 2017-10-19 | End: 2017-10-19

## 2017-10-19 RX ADMIN — SIMETHICONE CHEW TAB 80 MG 80 MG: 80 TABLET ORAL at 12:06

## 2017-10-19 RX ADMIN — SIMETHICONE CHEW TAB 80 MG 80 MG: 80 TABLET ORAL at 06:51

## 2017-10-19 RX ADMIN — HYDROCODONE BITARTRATE AND ACETAMINOPHEN 1 TABLET: 7.5; 325 TABLET ORAL at 09:19

## 2017-10-19 RX ADMIN — LACTULOSE 20 G: 20 SOLUTION ORAL at 09:20

## 2017-10-19 RX ADMIN — Medication 1 CAPSULE: at 09:20

## 2017-10-19 RX ADMIN — POLYETHYLENE GLYCOL 3350 17 G: 17 POWDER, FOR SOLUTION ORAL at 09:19

## 2017-10-19 RX ADMIN — HEPARIN SODIUM 5000 UNITS: 5000 INJECTION, SOLUTION INTRAVENOUS; SUBCUTANEOUS at 09:19

## 2017-10-19 NOTE — PROGRESS NOTES
CC:  Colonic stricture follow-up    HPI:  Tolerating regular diet, having multiple bowel movements, abdominal pain basically resolved, some increased and bloating with advancing diet.    PE:    Awake, alert  VS: Afebrile vital signs stable  Abdomen: Soft, nondistended, basically nontender, bowel sounds normal    RADIOLOGY:  KUB today demonstrated small to moderate residual barium scattered throughout the colon with no evidence of small bowel distention and on my review of images no evidence of significant colonic distention    IMPRESSION & PLAN:  Splenic flexure colonic stricture of uncertain etiology.  Clinically she is doing reasonably well and I think could safely be discharged home and should follow up with me in 2 weeks.  I've instructed her to take MiraLAX twice a day once she gets home to ensure that she clears the rest of the barium.

## 2017-10-19 NOTE — DISCHARGE SUMMARY
[facility]    ASSOCIATES  [phone]    DISCHARGE SUMMARY  [facility]    Patient Identification:  Name: Kimi Thomas  Age: 57 y.o.  Sex: female  :  1960  MRN: [id number]  Primary Care Physician: Petros Roach MD    Admit date: 10/9/2017  Discharge date and time: No discharge date for patient encounter.     Discharge Diagnoses:Principal Problem:    Generalized abdominal pain  Active Problems:    Status post nephrectomy    History of renal cell carcinoma    Drug-induced constipation    Abnormal CT of the abdomen       History of present illness from H&P:    Patient is a 57-year-old female with past medical history as noted below was in her usual state of her health until  after having her left nephrectomy done in August of this year.  Her surgery was performed on  for renal cell carcinoma.  The patient she had a follow-up visit with Dr. Thorpe week after surgery and everything was fine she did okay since then until  when she started noticing that her left side of her abdomen back was hurting.  Around that time she was also started on vitamin D pills which was giving her diarrhea.  Her abdominal discomfort continued to get worse to the point that she decided to come to the emergency room for further evaluation and was found to have free fluid in the peritoneal cavity of unclear etiology.  Patient is being admitted for further evaluation by urology service and we were asked to admit the patient to expedite this.  Patient says that her pain gets worse when she moves and turn.  Patient denies any fever or denies any chills denies any nausea vomiting or diarrhea except when she took the vitamin D pills.  She denies any rash.  She denies any exposure to sick contact.  She denies taking any new antibiotics recently.  Apparently her nephrectomy was total/radical nephrectomy performed via cup ascorbic  approach.    Hospital Course:     Thought that she was constipated and did have improvement with a good bowel regimen, on colonoscopy there was an area of stenosis. Which was seen on further CT imaging and barium enema.  She is still having some abd pain and bloating.  Dr Oconnor feels the patient may ultimately need surgery but she is eating well and the exam is benign.  She is to take miralax bid.    Generalized abdominal pain      Status post nephrectomy      History of renal cell carcinoma      Drug-induced constipation      Abnormal CT of the abdomen    Anemia- Hb c, needs multiple with iron      Consults:     Consults     Date and Time Order Name Status Description    10/16/2017 1449 Inpatient Consult to General Surgery      10/12/2017 1539 Inpatient Consult to Gastroenterology Completed     10/9/2017 1333 [facility] (on-call MD unless specified) Completed     10/9/2017 1318 Urology (on-call MD unless specified) Completed     10/9/2017 1229 Urology (on-call MD unless specified) Completed             Results from last 7 days  Lab Units 10/19/17  0449   WBC 10*3/mm3 6.27   HEMOGLOBIN g/dL 8.5*   HEMATOCRIT % 24.9*   PLATELETS 10*3/mm3 379         Results from last 7 days  Lab Units 10/15/17  0539   SODIUM mmol/L 138   POTASSIUM mmol/L 3.7   CHLORIDE mmol/L 100   CO2 mmol/L 25.7   BUN mg/dL 12   CREATININE mg/dL 1.01*   GLUCOSE mg/dL 90   CALCIUM mg/dL 9.4       Significant Diagnostic Studies:   Lab Results   Component Value Date    WBC 6.27 10/19/2017    HGB 8.5 (L) 10/19/2017    HCT 24.9 (L) 10/19/2017     10/19/2017     No results found for: NA, K, CL, CO2, BUN, CREATININE, GLUCOSE  No results found for: CALCIUM, MG, PHOS  No results found for: AST, ALT, ALKPHOS  No results found for: APTT, INR  No results found for: COLORU, CLARITYU, SPECGRAV, PHUR, PROTEINUR, GLUCOSEU, KETONESU, BLOODU, NITRITE, LEUKOCYTESUR, BILIRUBINUR, UROBILINOGEN, RBCUA, WBCUA, BACTERIA  No results found for: TROPONINT, TROPONINI,  BNP  No components found for: HGBA1C;2  No components found for: TSH;2    Imaging Results (all)     Procedure Component Value Units Date/Time    CT Abdomen Pelvis Without Contrast [[id number]] Collected:  10/09/17 1253     Updated:  10/10/17 1145    Narrative:       CT ABDOMEN AND PELVIS WITHOUT CONTRAST     HISTORY: Abdominal pain, status post nephrectomy one month ago.     TECHNIQUE: Axial CT images of the abdomen and pelvis were obtained  without administration of intravenous contrast. The patient was not  given oral contrast. Coronal and sagittal reformats were obtained.     COMPARISON: None.     FINDINGS: There are bilateral trace layering pleural effusions with  areas of atelectasis. There is moderate intra-abdominal fluid present.  The liver demonstrates normal attenuation. Low-density subcapsular  lesion in the right hepatic lobe is seen. The spleen is mildly enlarged  measuring 12 x 9 cm in craniocaudal dimension. The gallbladder, pancreas  is unremarkable on CT. The right adrenal gland is normal. The right  kidney is unremarkable. There has been a recent left nephrectomy. The  left adrenal is not separately identified. There is soft tissue in this  region likely representing postoperative change. The urinary bladder is  minimally distended limiting evaluation. Colonic diverticulosis is  present. No evidence of bowel obstruction. The midleft colon appears  adherent to the postoperative bed with circumferential wall thickening.  The uterus is not identified and is likely surgically absent.       Impression:       1. Status post left nephrectomy. The midleft colon demonstrates wall  thickening within the operative bed. This finding is likely reactive due  to recent surgery although colitis remains on the differential. There is  moderate intra-abdominal fluid that demonstrates relatively low  attenuation values ranging from 15 to 25 Hounsfield units.   2. Bilateral small layering effusions with bibasilar  atelectasis within  the lower lobes.  3. Small hypodense liver lesion. The imaging findings are nonspecific  and correlation with remote imaging would be most helpful.     These findings were discussed with Dr. Geren by telephone at 12:28 PM  on 10/09/2017.     Radiation dose reduction techniques were utilized, including automated  exposure control and exposure modulation based on body size.     This report was finalized on 10/10/2017 11:42 AM by Dr. Charlene Ferreira MD.       XR Abdomen KUB [[id number]] Collected:  10/10/17 1519     Updated:  10/10/17 1523    Narrative:       SUPINE ABDOMEN 2 VIEWS     HISTORY: 57-year-old female with abdominal pain and distention following  nephrectomy     COMPARISON: CT of 10/09/2017     FINDINGS:  1. Normal bowel gas pattern without obstruction, free air nor dilatation  of loops.  2. Moderate fecal burden right colon.  3. Surgical staples over the left renal bed.  4. Minimal bibasilar atelectasis and small left effusion.     This report was finalized on 10/10/2017 3:20 PM by Dr. Steven Bro MD.       XR Abdomen KUB [[id number]] Collected:  10/11/17 1155     Updated:  10/12/17 1046    Narrative:       KUB 10/11/2017      HISTORY: Ileus.     FINDINGS: 2 images are submitted. The bowel gas pattern is unremarkable.  No bowel dilatation is seen. Small amount of stool is seen in the colon.  Surgical clips overlie the abdomen.       Impression:       No acute process.     This report was finalized on 10/12/2017 10:43 AM by Dr. Lenny Fletcher MD.       XR Abdomen Flat & Upright [[id number]] Collected:  10/13/17 1251     Updated:  10/13/17 1257    Narrative:       XR ABDOMEN FLAT AND UPRIGHT-     INDICATIONS: Possible small bowel obstruction     TECHNIQUE: SUPINE AND UPRIGHT VIEWS OF THE ABDOMEN     COMPARISON: 10/11/2017     FINDINGS:      The bowel gas pattern is nonobstructive. No free air seen under the  diaphragm. No significant air-fluid levels on the upright view.  Mild  colonic fecal retention is seen. Surgical clips are seen in the left  aspect of the abdomen. Follow-up can be obtained as indications persist.        Minimal likely atelectasis or infiltrate at the left lung base suggest  continued follow-up of the chest.          Impression:          As described.     This report was finalized on 10/13/2017 12:54 PM by Dr. Job Almonte MD.       XR Abdomen Flat & Upright [[id number]] Collected:  10/16/17 1355     Updated:  10/16/17 1623    Narrative:       CLINICAL HISTORY: Patient with right lower abdominal pain. Patient with  no dilated large or small bowel but with incomplete colonoscopy to the  splenic flexure area near the left nephrectomy bed following left  nephrectomy on 08/28/2017 with reported tumor type of oncocytoma.  Diffuse abdominal and pelvic ascites on the CT abdomen and pelvis exam  of 10/09/2017 with attenuation values of 15 to 25 Hounsfield units. Hazy  mesenteric edema or fluid also demonstrated on CT abdomen and pelvis  study exam of 10/09/2017. Low hemoglobin and hematocrit.     EXAM: FLAT AND UPRIGHT ABDOMEN DATED 10/16/2017     FINDINGS: A total of 4 supine and erect AP projections of the abdomen  and pelvis are submitted. When compared to multiple recent supine, and  combined supine and erect, abdominal radiographic studies, the most  recent dated 10/13/2017, the current exams demonstrate new air-fluid  levels in the colon and in some small bowel loops but without any  obvious dilatation of large or small bowel. Trace air-fluid level in the  stomach is again demonstrated. Areas of patchy to strandy opacity in the  lung bases are again demonstrated. No subdiaphragmatic free air is seen.  Surgical clips in the medial left abdomen and uniform opacity to the  properitoneal fat stripes bilaterally, compatible with persistent  abdominal fluid, are demonstrated. Some traces of vascular calcification  in the left upper abdomen again appear to be  present. Some degenerative  change in the spine is again noted.     With review of the CT scan findings of 10/09/2017, the report of  colonoscopy to the proximal descending colon level, and available  laboratory findings, the requesting physician for the currently  requested barium enema, Dr. Armando King, was contacted by Dr. Mann. He agrees with Dr. Mann that, in light of the current patient  imaging and laboratory findings, perhaps barium enema should be delayed  so that repeat CT scanning could be considered, to see if there is any  significant change in the CT findings and so that the etiology of the  abdominal fluid might be determined before introducing barium into the  colon that would likely prevent good-quality CT scan imaging for short  to longer term interval, particularly in the absence of any current  findings to suggest small bowel or colon obstruction. Dr. Mann has  also informed Dr. King that, if the patient does not receive oral  contrast for a repeat CT scan study, and if the patient is maintained on  oral fluids until midnight and then is NPO, it might still be possible  to perform the barium enema tomorrow without further bowel prep,  presuming the various clinicians caring for the patient, or to be  determined by the current admitting physician, agree that barium enema  is the required next step in the patient's evaluation.     No fluoroscopy was used. A total of 4 digital overhead radiographs are  submitted.     CONCLUSION: Traces of gas and associated air-fluid levels in the colon  proximal and distal to the area of previously demonstrated thickening of  colon wall and termination of colonoscopy at the splenic flexure area.  No dilatation of large or small bowel is seen on the current  radiographs. Consideration is being given to appropriate next evaluation  of the patient in light of the CT scan findings 1 week ago, as discussed  above. A barium enema was not performed.      This report was finalized on 10/16/2017 4:19 PM by Dr. Mak Mann MD.       CT Abdomen Pelvis Without Contrast [[id number]] Collected:  10/16/17 1645     Updated:  10/16/17 1649    Narrative:       CT ABDOMEN AND PELVIS WITHOUT IV CONTRAST     HISTORY: 57-year-old female status post left nephrectomy for renal cell  carcinoma approximately one month ago. Persistent abdominal pain and  bloating. Focal narrowing on colonoscopy at the left colon.     TECHNIQUE: Radiation dose reduction techniques were utilized, including  automated exposure control and exposure modulation based on body size.   3 mm images were obtained through the abdomen and pelvis without the  administration of IV contrast. Compared with previous CT from  10/09/2017.     FINDINGS: There is no significant interval change in the volume of free  fluid throughout the abdomen and pelvis. Some of the fluid is now at the  dependent aspects of the abdomen and pelvis. The noncontrasted liver and  gallbladder appear unremarkable. Spleen measures approximately 13 cm in  maximal diameter, measured on the sagittal reconstruction series.  Noncontrasted pancreas, right adrenal, and right kidney appear  unremarkable. Given constraints of noncontrasted technique, there does  appear to be a short segment of contracted or narrowed descending colon.  The descending colon at the level of the inferior margin of the spleen  appears tethered to the left quadratus lumborum and psoas muscles. The  colon proximally is slightly distended and the colon distally is normal  in caliber. There has been significant interval increase in the size of  the small pleural effusions and there is more atelectasis at both lower  lobes with likely segmental collapse.       Impression:       1. There is a short segment of proximal/mid descending colon which  appears narrowed/contracted and tethered at the surgical bed to the left  quadratus lumborum and psoas muscles. The colon proximally is  mildly  distended.  2. There is no significant change in the volume of free fluid within the  abdomen or pelvis. Some of the free fluid is in the nondependent aspects  of the abdomen and pelvis which is concerning.  3. Interval increase in small pleural effusions and significant increase  in the atelectasis at the lower lobes with probable subsegmental  collapse.     This report was finalized on 10/16/2017 4:46 PM by Dr. Mary Eduardo MD.       FL Barium Enema [[id number]] Collected:  10/17/17 1221     Updated:  10/17/17 1241    Narrative:       CLINICAL HISTORY: 57-year-old female with incomplete colonoscopy to the  level of an abnormal colon segment at the splenic flexure, with  corresponding wall thickening demonstrated in this area on CT abdomen  and pelvis study examinations of 10/09/2017 and 10/16/2017. Possible  partial obstruction at the splenic flexure. Patient underwent left  nephrectomy 2 months ago.     EXAM: BARIUM ENEMA DATED 10/17/2017.     FINDINGS: The preliminary radiographs of the abdomen and pelvis  demonstrate increased gas in the transverse colon compared to abdominal  radiographs of 10/16/2017. The surgical clips in the medial left mid  abdomen are again demonstrated. Some degenerative change in the spine  and some traces of vascular calcification or costal cartilage  calcification at the left upper abdomen are again demonstrated. The  study was discussed with the requesting surgeon, Dr. George Oconnor,  prior to undertaking the exam.     A barium and water liquid mixture was introduced into the rectum in a  retrograde manner. It was feasible to obtain retrograde flow of barium  to the cecum with reflux of barium into the terminal ileum. There is  acute angulation and tenting, as well as narrowing of caliber with  mucosal contour spiculation at the splenic flexure and proximal  descending portion of the colon. The involved segment is 12-13 cm in  length and has luminal diameters between 1.3 and  2.3 cm. Contours of the  more proximal and more distal colon are otherwise unremarkable. No fixed  filling defect, encircling lesion, or abnormal extrinsic compression of  the colon is seen with the exception of the abnormality at the splenic  flexure. I cannot determine with certainty how much of this abnormality  is related to fibrotic adhesive scarring about the segment and how much  may reflect intrinsic abnormality or ischemia of the segment. Malignancy  is not confirmed or excluded with this appearance. After acquisition of  spot image radiographs by Dr. Mann with patient in multiple  projections during administration of the barium, overhead radiographs in  multiple projections were obtained by the technologists. The patient was  allowed to go to the restroom after the enema tube was removed from her  rectum. A series of 2 postevacuation radiographs demonstrate clearing of  approximately one half or more of the administered barium with modest  reflux into the terminal ileum. Some apparent opacification of the  vermiform appendix, and greater distention of the proximal colon from  cecum to splenic flexure than is seen in the more distal descending and  sigmoid colon is demonstrated.     A total of 2 minutes 12 seconds fluoroscopy was used. A total of 12  digital overhead radiographs and 29 digital fluoroscopic spot image  radiographs were acquired.     CONCLUSION: Angular tenting of a moderately narrowed caliber segment of  colon at and immediately distal to the splenic flexure with abnormal  mucosal contours of the segment, consisting of spiculated margins. The  abnormal segment has overall length of approximately 12-13 cm. Etiology  is not determined with certainty as discussed above.     This report was finalized on 10/17/2017 12:38 PM by Dr. Mak Mann MD.        Paracentesis [[id number]] Collected:  10/17/17 1507    Specimen:  Body Fluid Updated:  10/17/17 1511    Narrative:       ULTRASOUND-GUIDED  PARACENTESIS  EXAM CANCELED DUE TO INADEQUATE FLUID.     HISTORY: Female who is 57 years-old, with ascites and paracentesis was  requested.     Procedure, including risk of hemorrhage, infection, recurrence,  discussed with the patient prior to examination. Consent was obtained.     PROCEDURE: The patient was placed in supine position. Localization was  performed with real-time ultrasound guidance and skin marker was placed.  Image obtained.     Volume of fluid is in adequate for safe diagnostic paracentesis.     Procedure was not performed.     This report was finalized on 10/17/2017 3:08 PM by Dr. Steven Bro MD.       XR Abdomen KUB [[id number]] Collected:  10/19/17 0949     Updated:  10/19/17 1024    Narrative:       ONE VIEW ABDOMEN     HISTORY: Abdominal pain and constipation.     FINDINGS: The patient had a previous barium enema performed 2 days ago  and there is a small-to-moderate amount of residual barium scattered  throughout the colon and showing mild improvement from the  postevacuation image of 2 days ago. There is no evidence of small bowel  distention.     This report was finalized on 10/19/2017 10:21 AM by Dr. Nick Sanchez MD.             TEST  RESULTS PENDING AT DISCHARGE   Order Current Status    Methylmalonic Acid, Serum In process          Patient Instructions:    Kimi Thomas   Home Medication Instructions ENMA:[id number]    Printed on:10/19/17 1611   Medication Information                      polyethylene glycol (MIRALAX) packet  Take 17 g by mouth 2 (Two) Times a Day.               No future appointments.  Follow-up Information     Follow up with George Oconnor MD. Schedule an appointment as soon as possible for a visit in 2 week(s).    Specialty:  General Surgery    Why:  Take Miralax two time a day    Contact information:    [address] [address] [phone]          Discharge Order     Start     Ordered    10/19/17 1611  Discharge patient  Once      Expected Discharge Date:  10/19/17    Discharge Disposition:  Home or Self Care        10/19/17 1610          F/u Dr Oconnor 2 weeks    F/u Dr Kevin liu 15    F/u with PMD next week      Total time spent discharging patient including evaluation, post hospitalization follow up,  medication and post hospitalization instructions and education, total time exceeds 30 minutes.    Signed:  Armando King MD  10/19/2017  4:11 PM

## 2017-10-19 NOTE — PLAN OF CARE
Problem: Patient Care Overview (Adult)  Goal: Plan of Care Review  Outcome: Ongoing (interventions implemented as appropriate)    10/19/17 0451   Coping/Psychosocial Response Interventions   Plan Of Care Reviewed With patient   Patient Care Overview   Progress improving   Outcome Evaluation   Outcome Summary/Follow up Plan doing well, had 6x loose stools since morning, occassional abdominal pain, medicated, same treatment continued for KUB today       Goal: Adult Individualization and Mutuality  Outcome: Ongoing (interventions implemented as appropriate)  Goal: Discharge Needs Assessment  Outcome: Ongoing (interventions implemented as appropriate)

## 2017-10-23 ENCOUNTER — TELEPHONE (OUTPATIENT)
Dept: SURGERY | Facility: CLINIC | Age: 57
End: 2017-10-23

## 2017-10-23 DIAGNOSIS — Z98.890: Primary | ICD-10-CM

## 2017-10-23 LAB — METHYLMALONATE SERPL-SCNC: 361 NMOL/L (ref 0–378)

## 2017-10-23 NOTE — TELEPHONE ENCOUNTER
Patient had called stating that when she was discharged, patient was instructed to use Miralax BID to help eliminate any barium left throughout her colon.  Patient stated that she began to have abdominal cramping, nausea and immediately needs to defecate. She had called the nurses hotline over the weekend and was instructed to stop the Miralax on Saturday but if she felt better to continue it on Sunday but to reduce the Miralax to once daily. Even after doing this, she began to immediately experience the same symptoms as before but also had vomiting.    Dr. Oconnor was in the office and recommended the patient d/c the Miralax and if she wanted, we would order a KUB to evaluate if any residual barium is left.    Patient agreed to d/c Miralax and would like to have KUB done.

## 2017-11-01 ENCOUNTER — HOSPITAL ENCOUNTER (OUTPATIENT)
Dept: GENERAL RADIOLOGY | Facility: HOSPITAL | Age: 57
Discharge: HOME | End: 2017-11-01
Attending: SURGERY | Admitting: SURGERY

## 2017-11-01 DIAGNOSIS — Z98.890: ICD-10-CM

## 2017-11-01 PROCEDURE — 74000 HC ABDOMEN KUB: CPT

## 2017-11-02 ENCOUNTER — OFFICE VISIT (OUTPATIENT)
Dept: SURGERY | Facility: CLINIC | Age: 57
End: 2017-11-02

## 2017-11-02 VITALS — HEIGHT: 60 IN | WEIGHT: 150.6 LBS | BODY MASS INDEX: 29.57 KG/M2 | HEART RATE: 97 BPM | OXYGEN SATURATION: 96 %

## 2017-11-02 DIAGNOSIS — K56.699 COLONIC STRICTURE (HCC): Primary | ICD-10-CM

## 2017-11-02 PROCEDURE — 99214 OFFICE O/P EST MOD 30 MIN: CPT | Performed by: SURGERY

## 2017-11-02 RX ORDER — ONDANSETRON 4 MG/1
4 TABLET, FILM COATED ORAL EVERY 8 HOURS PRN
Refills: 0 | COMMUNITY
Start: 2017-10-30

## 2017-11-03 NOTE — PROGRESS NOTES
Assessment and plan:  57-year-old lady with colonic stricture at the proximal descending colon near the site of recent nephrectomy of uncertain etiology.  Given that she is clinically improving I recommended a CT scan to be done in approximately 3 weeks to reassess the previously seen abnormalities.  If the CT scan demonstrates improvement then at some point in the relatively near future we will need to proceed with colonoscopy as well.    Chief complaint: Follow-up on colonic stricture    History of present illness: 57-year-old lady whom I initially saw on 10/18/2017 during a hospitalization for abdominal pain and finding of colonic stricture on CT scan and barium enema.  She also had a partial colonoscopy which was incomplete secondary to the colonic stricture without definite intrinsic lesion.  Etiology remains indeterminate at this point.  She had laparoscopic left nephrectomy 8/28/2017 and the side of the abnormality on CT scan is in the region of the surgical bed indicating concern for scarring and/or ischemic etiology.    Currently she is feeling much better, especially in the last 3 days.  She has minimal generalized abdominal discomfort which is worse with normal sized meals but better when she eats smaller meals more frequently.  She is taking stool softeners which helped her having regular bowel movements.    Allergies:  -Clindamycin  -Oxycodone  -Amoxicillin    Medications:  None    Past medical history:  Oncocytoma left kidney    Past surgical history:  -Laparoscopic left nephrectomy 8/28/2017  -Breast lumpectomy  -Hysterectomy    Family history:  Negative for colorectal cancer    Social history:  Denies tobacco use  Denies alcohol use    Review of systems:  Negative for nausea, vomiting, diarrhea, constipation of any consequence, fever, chills, night sweats, dysuria    Radiology:  -CT abdomen pelvis 10/16/2017: Short segment of proximal/mid descending colon which appears narrowed/contracted and  tethered at the surgical bed to the left quadratus lumborum and psoas muscles.  Colon proximally is mildly distended.  -KUB 11/1/2017: Moderate amount of stool scattered in the colon and there is very small amount of residual contrast in the appendix.  No evidence of small bowel distention.  I reviewed the images and concur, noting also that there is no evidence of colonic distention.    George Oconnor M.D.

## 2017-11-21 ENCOUNTER — HOSPITAL ENCOUNTER (OUTPATIENT)
Dept: CT IMAGING | Facility: HOSPITAL | Age: 57
Discharge: HOME | End: 2017-11-21
Attending: SURGERY | Admitting: SURGERY

## 2017-11-21 DIAGNOSIS — K56.699 COLONIC STRICTURE (HCC): ICD-10-CM

## 2017-11-21 LAB — CREAT BLDA-MCNC: 1.1 MG/DL (ref 0.6–1.3)

## 2017-11-21 PROCEDURE — 82565 ASSAY OF CREATININE: CPT

## 2017-11-21 PROCEDURE — 0 IOPAMIDOL 61 % SOLUTION: Performed by: SURGERY

## 2017-11-21 PROCEDURE — 74177 CT ABD & PELVIS W/CONTRAST: CPT

## 2017-11-21 RX ADMIN — IOPAMIDOL 85 ML: 612 INJECTION, SOLUTION INTRAVENOUS at 11:30

## 2017-11-21 NOTE — NURSING NOTE
Patient stopped at radiology  after having CT scan with complain of hand swelling. Sent back to the nurses desk, left hand sightly swollen at insertion site and complain of discomfort. Area soft to touch, no bleeding noted on gauze dressing. Instructed patient to put ice on site for swelling and discomfort today. Voiced understanding and ambulated to car per self.

## 2017-11-28 ENCOUNTER — TELEPHONE (OUTPATIENT)
Dept: SURGERY | Facility: CLINIC | Age: 57
End: 2017-11-28

## 2017-11-29 ENCOUNTER — PREP FOR SURGERY (OUTPATIENT)
Dept: OTHER | Facility: HOSPITAL | Age: 57
End: 2017-11-29

## 2017-11-29 DIAGNOSIS — R93.5 ABNORMAL CT OF THE ABDOMEN: Primary | ICD-10-CM

## 2017-11-29 NOTE — TELEPHONE ENCOUNTER
Left message regarding CT results. Needs colonoscopy in January or February, please call and schedule, orders are in.

## 2018-04-25 NOTE — PLAN OF CARE
Problem: GI Endoscopy (Adult)  Goal: Signs and Symptoms of Listed Potential Problems Will be Absent or Manageable (GI Endoscopy)  Outcome: Ongoing (interventions implemented as appropriate)    10/14/17 0950   GI Endoscopy   Problems Assessed (GI Endoscopy) bleeding;pain;hypoxia/hypoxemia   Problems Present (GI Endoscopy) none            WDL

## 2021-03-30 ENCOUNTER — BULK ORDERING (OUTPATIENT)
Dept: CASE MANAGEMENT | Facility: OTHER | Age: 61
End: 2021-03-30

## 2021-03-30 DIAGNOSIS — Z23 IMMUNIZATION DUE: ICD-10-CM

## 2022-01-05 NOTE — CONSULTS
Left vm for pt to call back with any questions. [facility]  Initial Inpatient Consult Note    Referring Provider: Dr DROA Arreguin    Reason for Consultation: abdominal pain    Subjective     History of present illness:    57 y.o. female with history of recent nephrectomy for left renal mass in August presents with 2 week history of progressive abdominal discomfort.  She initially had a sensation of abdominal distension, bloating and early satiety.  This progressed to feel more painful and she had irregular BMs.  She attributed some changes to her Vit D replacement which she has stopped.  Since admission she has rcvd bowel stimulants and has had looser stools and some crampy lower abdominal pain.  She denies fever or blood in her stool.    She felt well for 2 weeks beginning 2 weeks postop.  Her symptoms as described above then began.  She has had a previous colonoscopy 5 years ago at [facility] which she reports was normal.  She has a history of CRC in a first degree relative.  She has a CT this admission showing some thickness of the colon in the region of the nephrectomy bed.  Urology has commented that these changes would be unexpected this far out from her surgery.    Past Medical History:  Past Medical History:   Diagnosis Date   • Kidney tumor      Past Surgical History:  Past Surgical History:   Procedure Laterality Date   • BREAST LUMPECTOMY     • HYSTERECTOMY     • KNEE ARTHROSCOPY     • NEPHRECTOMY Left 8/28/2017    Procedure: HAND ASSISTED LEFT LAPAROSCOPIC NEPHRECTOMY ;  Surgeon: Sarthak Thorpe Jr., MD;  Location: [facility];  Service:       Social History:   Social History   Substance Use Topics   • Smoking status: Former Smoker     Packs/day: 0.50     Years: 30.00     Types: Cigarettes     Quit date: 8/25/2017   • Smokeless tobacco: Never Used      Comment: Last cigarette 8/25/17   • Alcohol use No      Family History:  Family History   Problem Relation Age of Onset   • Malig Hyperthermia Neg Hx        Home Meds:  No  prescriptions prior to admission.     Current Meds:     bisacodyl 10 mg Rectal BID   heparin (porcine) 5,000 Units Subcutaneous Q12H     Allergies:  Allergies   Allergen Reactions   • Oxycodone Swelling     Review of Systems  All systems were reviewed and negative except for:  Gastrointestinal: postitive for  bloating / distention, change in bowel habits and early satiety  Musculoskeletal: positive for  back pain     Objective     Vital Signs  Temp:  [97.4 °F (36.3 °C)-99 °F (37.2 °C)] 98.2 °F (36.8 °C)  Heart Rate:  [80-85] 84  Resp:  [16-18] 18  BP: (102-112)/(57-69) 112/57  Physical Exam:  General Appearance:    Alert, cooperative, in no acute distress   Head:    Normocephalic, without obvious abnormality, atraumatic   Eyes:            Lids and lashes normal, conjunctivae and sclerae normal, no   icterus   Throat:   No oral lesions, no thrush, oral mucosa moist       Lungs:     Clear to auscultation,respirations regular, even and                   unlabored    Heart:    Regular rhythm and normal rate, normal S1 and S2, no            murmur   Chest Wall:    No abnormalities observed   Abdomen:     Slightly distended, nontender normal bs   Rectal:     Deferred   Extremities:   no edema   Skin:   No bleeding, bruising or rash   Lymph nodes:   No palpable adenopathy   Psychiatric:  Judgement and insight: normal   Orientation to person place and time: normal   Mood and affect: normal   Results Review:   I reviewed the patient's new clinical results.      Results from last 7 days  Lab Units 10/12/17  0559 10/11/17  0700 10/10/17  1515 10/10/17  0522   WBC 10*3/mm3 5.58 6.02  --  5.77   HEMOGLOBIN g/dL 8.3* 8.9* 8.9* 8.7*   HEMATOCRIT % 24.5* 26.0* 25.5* 25.7*   PLATELETS 10*3/mm3 300 307  --  274       Results from last 7 days  Lab Units 10/11/17  0700 10/10/17  0521 10/09/17  1102   SODIUM mmol/L 139 139 140   POTASSIUM mmol/L 4.5 4.4 4.6   CHLORIDE mmol/L 106 104 100   CO2 mmol/L 23.1 24.9 26.6   BUN mg/dL 9 15 16    CREATININE mg/dL 0.94 1.19* 1.39*   CALCIUM mg/dL 9.2 9.2 10.5   BILIRUBIN mg/dL  --  0.4 0.5   ALK PHOS U/L  --  73 96   ALT (SGPT) U/L  --  <5 7   AST (SGOT) U/L  --  6 9   GLUCOSE mg/dL 87 85 105*       Results from last 7 days  Lab Units 10/09/17  1240   INR  1.12*       Lab Results  Lab Value Date/Time   LIPASE 12 (L) 10/09/2017 1102       Radiology:  XR Abdomen KUB   Final Result   No acute process.       This report was finalized on 10/12/2017 10:43 AM by Dr. Lenny Fletcher MD.          XR Abdomen KUB   Final Result      CT Abdomen Pelvis Without Contrast   Final Result   1. Status post left nephrectomy. The midleft colon demonstrates wall   thickening within the operative bed. This finding is likely reactive due   to recent surgery although colitis remains on the differential. There is   moderate intra-abdominal fluid that demonstrates relatively low   attenuation values ranging from 15 to 25 Hounsfield units.    2. Bilateral small layering effusions with bibasilar atelectasis within   the lower lobes.   3. Small hypodense liver lesion. The imaging findings are nonspecific   and correlation with remote imaging would be most helpful.       These findings were discussed with Dr. Green by telephone at 12:28 PM   on 10/09/2017.       Radiation dose reduction techniques were utilized, including automated   exposure control and exposure modulation based on body size.       This report was finalized on 10/10/2017 11:42 AM by Dr. Charlene Ferreira MD.              Assessment/Plan   Patient Active Problem List   Diagnosis   • Left renal mass   • Generalized abdominal pain   • Status post nephrectomy   • History of renal cell carcinoma   • Dehydration   • Drug-induced constipation     Impression:  1. Abdominal pain  2. Abnormal CT scan  3. Altered bowel habits    Plan:  - stool for c diff  - hold further bowel stimulants  - request imaging from [facility] - it sounds as though she may have had a PET scan  -  she will likely warrant c/s for further eval if c diff neg    I discussed the patients findings and my recommendations with patient and family.    Barbie Zhu MD

## 2023-03-09 NOTE — PLAN OF CARE
Problem: Patient Care Overview (Adult)  Goal: Plan of Care Review  Outcome: Ongoing (interventions implemented as appropriate)    10/10/17 0331   Coping/Psychosocial Response Interventions   Plan Of Care Reviewed With patient   Patient Care Overview   Progress no change   Outcome Evaluation   Outcome Summary/Follow up Plan Dr Ann here to see pt tonight but plans to leave plan of care up to the urology consult(Dr. Thorpe). Midline vertcal incision and lap x2 sites appears to be healing well from surgery in Aug. for left nephrectomy. Apparently pt was doing well at home and now returns with abd pain. Small area of fluid seen with CT. Pt c/o some back and abd discomfort. She was taking Norco for pain but now is asking for Morphine more often . No vomitng at this time.Afebrile        Goal: Adult Individualization and Mutuality  Outcome: Ongoing (interventions implemented as appropriate)  Goal: Discharge Needs Assessment  Outcome: Ongoing (interventions implemented as appropriate)    Problem: Pain, Acute (Adult)  Goal: Acceptable Pain Control/Comfort Level  Outcome: Ongoing (interventions implemented as appropriate)       No

## (undated) DEVICE — Device: Brand: DEFENDO AIR/WATER/SUCTION AND BIOPSY VALVE

## (undated) DEVICE — SPNG LAP 18X18IN LF STRL PK/5

## (undated) DEVICE — SUT VIC 0 CT1 36IN J946H

## (undated) DEVICE — SOL NACL 0.9PCT 1000ML

## (undated) DEVICE — ANTIBACTERIAL UNDYED BRAIDED (POLYGLACTIN 910), SYNTHETIC ABSORBABLE SUTURE: Brand: COATED VICRYL

## (undated) DEVICE — LAPAROSCOPIC GAS CONDITIONING DEVICE.: Brand: INSUFLOW

## (undated) DEVICE — VISUALIZATION SYSTEM: Brand: CLEARIFY

## (undated) DEVICE — TUBING, SUCTION, 1/4" X 10', STRAIGHT: Brand: MEDLINE

## (undated) DEVICE — GOWN,NON-REINFORCED,SIRUS,SET IN SLV,XL: Brand: MEDLINE

## (undated) DEVICE — 3M™ IOBAN™ 2 ANTIMICROBIAL INCISE DRAPE 6640EZ: Brand: IOBAN™ 2

## (undated) DEVICE — ENDOCUT SCISSOR TIP, DISPOSABLE: Brand: RENEW

## (undated) DEVICE — SINGLE-USE BIOPSY FORCEPS: Brand: RADIAL JAW 4

## (undated) DEVICE — APPL CHLORAPREP W/TINT 26ML ORNG

## (undated) DEVICE — LOU LAP CHOLE: Brand: MEDLINE INDUSTRIES, INC.

## (undated) DEVICE — COVER,MAYO STAND,STERILE: Brand: MEDLINE

## (undated) DEVICE — 3M™ STERI-STRIP™ REINFORCED ADHESIVE SKIN CLOSURES, R1547, 1/2 IN X 4 IN (12 MM X 100 MM), 6 STRIPS/ENVELOPE: Brand: 3M™ STERI-STRIP™

## (undated) DEVICE — CANN NASL HI FLO COMFRT SFT A/ 7FT

## (undated) DEVICE — ACCESS PLATFORM FOR MINIMALLY INVASIVE SURGERY.: Brand: GELPORT® LAPAROSCOPIC  SYSTEM

## (undated) DEVICE — GLV SURG BIOGEL LTX PF 7

## (undated) DEVICE — ECHELON FLEX45 ENDOPATH STAPLER, ARTICULATING ENDOSCOPIC LINEAR CUTTER (NO CARTRIDGE): Brand: ECHELON ENDOPATH

## (undated) DEVICE — TOWEL,OR,DSP,ST,BLUE,STD,4/PK,20PK/CS: Brand: MEDLINE

## (undated) DEVICE — TOTAL TRAY, 16FR 10ML SIL FOLEY, URN: Brand: MEDLINE

## (undated) DEVICE — ENDOPATH XCEL BLADELESS TROCARS WITH STABILITY SLEEVES: Brand: ENDOPATH XCEL

## (undated) DEVICE — PENCL E/S HNDSWCH ROCKR CB

## (undated) DEVICE — DRSNG WND BORDR/ADHS NONADHR/GZ LF 4X4IN STRL

## (undated) DEVICE — CAP CONN RED

## (undated) DEVICE — LAPAROSCOPIC SMOKE ELIMINATION DEVICE: Brand: PNEUVIEW XE

## (undated) DEVICE — THE TORRENT IRRIGATION SCOPE CONNECTOR IS USED WITH THE TORRENT IRRIGATION TUBING TO PROVIDE IRRIGATION FLUIDS SUCH AS STERILE WATER DURING GASTROINTESTINAL ENDOSCOPIC PROCEDURES WHEN USED IN CONJUNCTION WITH AN IRRIGATION PUMP (OR ELECTROSURGICAL UNIT).: Brand: TORRENT

## (undated) DEVICE — INTENDED FOR TISSUE SEPARATION, AND OTHER PROCEDURES THAT REQUIRE A SHARP SURGICAL BLADE TO PUNCTURE OR CUT.: Brand: BARD-PARKER ® CARBON RIB-BACK BLADES

## (undated) DEVICE — ENDOPATH PNEUMONEEDLE INSUFFLATION NEEDLES WITH LUER LOCK CONNECTORS 120MM: Brand: ENDOPATH

## (undated) DEVICE — 3M™ STERI-STRIP™ COMPOUND BENZOIN TINCTURE 40 BAGS/CARTON 4 CARTONS/CASE C1544: Brand: 3M™ STERI-STRIP™

## (undated) DEVICE — DEV LAP LIGASURE BLNT SEALER/DIV MARYLAND 37CM

## (undated) DEVICE — TBG 02 CRUSH RESIST LF CLR 7FT